# Patient Record
Sex: FEMALE | Race: WHITE | Employment: OTHER | URBAN - METROPOLITAN AREA
[De-identification: names, ages, dates, MRNs, and addresses within clinical notes are randomized per-mention and may not be internally consistent; named-entity substitution may affect disease eponyms.]

---

## 2021-09-01 ENCOUNTER — HOSPITAL ENCOUNTER (OUTPATIENT)
Dept: PHYSICAL THERAPY | Age: 71
Setting detail: THERAPIES SERIES
Discharge: HOME | End: 2021-09-01
Payer: MEDICARE

## 2021-09-01 PROCEDURE — 97161 PT EVAL LOW COMPLEX 20 MIN: CPT

## 2021-09-01 NOTE — PLAN OF CARE
has worn orthotics for years and has issues with shoe wear. Changed shoes in 2018 and started having increased R foot pain that has gradually be increasing. 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 Has been noticing that she has callus on inside of small toe, more of a bunion on her R great toe and decreasing arch. Foot has been swelling some along arch as well. Has been feeling more limited with walking, shagging balls for volleyball. Use to shag volleyballs for 2-3 hours, but can hardly manage 1 hour. At practice every day. Has been using lace up ankle braces for the past 2 years. 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    Living Status/Prior Level of Function: Independent with ADLs and IADLs    OBJECTIVE:     ROM LEFT RIGHT   HIP Flex     HIP Abd     HIP Ext     HIP IR     HIP ER     Knee ext     Knee Flex     Ankle PF 50 70   Ankle DF 2 5   Ankle In 15 30   Ankle Ev 15 20   Strength  LEFT RIGHT   HIP Flexors     HIP Abductors     HIP Ext     Hip ER     Knee EXT (quad)     Knee Flex (HS)     Ankle DF  4   Ankle PF  4   Ankle Inv  3+   Ankle EV  3     Reflexes/Sensation:    [x]Dermatomes/Myotomes intact    [x]Reflexes equal and normal bilaterally   []Other:    Joint mobility:    []Normal    [x]Hypo   []Hyper    Palpation: tender to palpate along R navicular bone    Functional Mobility/Transfers: limited in shagging volleyballs and walking/standing for long periods    Posture: patient with 2 different size feet and different leg length. Has custom shoes build for height difference. Use of orthotics in shoes. Foot posture- patient with pes cavus presentation and increased plantar flexion of ray 1-4    Bandages/Dressings/Incisions: n/a    Gait: (include devices/WB status) mild lateral leaning    Orthopedic Special Tests:                        [x] Patient history, allergies, meds reviewed. Medical chart reviewed. See intake form. Review Of Systems (ROS):  [x]Performed Review of systems (Integumentary, CardioPulmonary, Neurological) by intake and observation. Intake form has been scanned into medical record. Patient has been instructed to contact their primary care physician regarding ROS issues if not already being addressed at this time.       Co-morbidities/Complexities (which will affect course of rehabilitation):   []None           Arthritic conditions   []Rheumatoid arthritis (M05.9)  []Osteoarthritis (M19.91)   Cardiovascular conditions   []Hypertension (I10)  []Hyperlipidemia (E78.5)  []Angina pectoris (I20)  []Atherosclerosis (I70)  []CVA Musculoskeletal conditions   []Disc pathology   []Congenital spine pathologies   []Prior surgical intervention  []Osteoporosis (M81.8)  []Osteopenia (M85.8)   Endocrine conditions   []Hypothyroid (E03.9)  []Hyperthyroid Gastrointestinal conditions   []Constipation (Q62.30)   Metabolic conditions   []Morbid obesity (E66.01)  []Diabetes type 1(E10.65) or 2 (E11.65)   []Neuropathy (G60.9)     Pulmonary conditions   []Asthma (J45)  []Coughing   []COPD (J44.9)   Psychological Disorders  [x]Anxiety (F41.9)  []Depression (F32.9)   []Other:   []Other:          Barriers to/and or personal factors that will affect rehab potential:              []Age  []Sex    []Smoker              []Motivation/Lack of Motivation                        [x]Co-Morbidities              []Cognitive Function, education/learning barriers              []Environmental, home barriers              []profession/work barriers  []past PT/medical experience  []other:  Justification: Patient is highly anxious individual     Falls Risk Assessment (30 days):   [x] Falls Risk assessed and no intervention required. [] Falls Risk assessed and Patient requires intervention due to being higher risk   TUG score (>12s at risk):     [] Falls education provided, including         ASSESSMENT: Patient is a 81 yo female who presents to therapy with R foot pain which is limiting her ability to stand and walk for greater than 1 hour and participate in helping out [employer] volleyball team. Upon assessment, patient with decreased joint mobility of R ankle, increased soft tissue restriction in R gastroc, as well as increased navicular drop and pf of rays 1-4. Patient will benefit from PT services, as well as new custom orthotic devices. Patient limited in financial situation, will perform assessment for orthotics - however may not plan to get devices at this time.      Functional Impairments:     []Noted lumbar/proximal hip/LE hypomobility   [x]Decreased LE functional ROM   [x]Decreased core/proximal hip strength and neuromuscular control   [x]Decreased LE functional strength   [x]Reduced balance/proprioceptive control   []other:      Functional Activity Limitations (from functional questionnaire and intake)   [x]Reduced ability to tolerate prolonged functional positions   []Reduced ability or difficulty with changes of positions or transfers between positions   []Reduced ability to maintain good posture and demonstrate good body mechanics with sitting, bending, and lifting   [x]Reduced ability to sleep   [] Reduced ability or tolerance with driving and/or computer work   []Reduced ability to perform lifting, carrying tasks   []Reduced ability to squat   []Reduced ability to forward bend   [x]Reduced ability to ambulate prolonged functional periods/distances/surfaces   [x]Reduced ability to ascend/descend stairs   []Reduced ability to run, hop or jump   [x]other:shag volleyballs     Participation Restrictions   []Reduced participation in self care activities   []Reduced participation in home management activities   [x]Reduced participation in work activities   []Reduced participation in social activities. []Reduced participation in sport activities. Classification :    []Signs/symptoms consistent with post-surgical status including decreased ROM, strength and function.    []Signs/symptoms consistent with joint sprain/strain   []Signs/symptoms consistent with patella-femoral syndrome   []Signs/symptoms consistent with knee OA/hip OA   []Signs/symptoms consistent with internal derangement of knee/Hip   []Signs/symptoms consistent with functional hip weakness/NMR control      [x]Signs/symptoms consistent with tendinitis/tendinosis    []signs/symptoms consistent with pathology which may benefit from Dry needling      []other:      Prognosis/Rehab Potential:      []Excellent   [x]Good    []Fair   []Poor    Tolerance of evaluation/treatment:    []Excellent   [x]Good    []Fair   []Poor    Physical Therapy Evaluation Complexity Justification  [x] A history of present problem with:  [] no personal factors and/or comorbidities that impact the plan of care;  [x]1-2 personal factors and/or comorbidities that impact the plan of care  []3 personal factors and/or comorbidities that impact the plan of care  [x] An examination of body systems using standardized tests and measures addressing any of the following: body structures and functions (impairments), activity limitations, and/or participation restrictions;:  [x] a total of 1-2 or more elements   [] a total of 3 or more elements   [] a total of 4 or more elements   [x] A clinical presentation with:  [x] stable and/or uncomplicated characteristics   [] evolving clinical presentation with changing characteristics  [] unstable and unpredictable characteristics;   [x] Clinical decision making of [] low, [] moderate, [] high complexity using standardized patient assessment instrument and/or measurable assessment of functional outcome. [x] EVAL (LOW) 73260 (typically 20 minutes face-to-face)  [] EVAL (MOD) 62406 (typically 30 minutes face-to-face)  [] EVAL (HIGH) 28917 (typically 45 minutes face-to-face)  [] RE-EVAL     PLAN:  Frequency/Duration:  1-2 days per week for 4 Weeks:  Interventions:  [x]  Therapeutic exercise including: strength training, ROM, for Lower extremity and core   [x]  NMR activation and proprioception for LE, Glutes and Core   [x]  Manual therapy as indicated for LE, Hip and spine to include: Dry Needling/IASTM, STM, PROM, Gr I-IV mobilizations, manipulation. [x] Modalities as needed that may include: thermal agents, E-stim, Biofeedback, US, iontophoresis as indicated  [x] Patient education on joint protection, postural re-education, activity modification, progression of HEP. HEP instruction: (see scanned forms)    GOALS:  Patient stated goal: walk without pain  [] Progressing: [] Met: [] Not Met: [] Adjusted    Therapist goals for Patient:   Short Term Goals: To be achieved in: 2 weeks  1. Independent in HEP and progression per patient tolerance, in order to prevent re-injury. [] Progressing: [] Met: [] Not Met: [] Adjusted  2. Patient will have a decrease in pain to facilitate improvement in movement, function, and ADLs as indicated by Functional Deficits. [] Progressing: [] Met: [] Not Met: [] Adjusted    Long Term Goals: To be achieved in: 4 weeks  1. Disability index score of 40% or less for the LEFS to assist with reaching prior level of function. [] Progressing: [] Met: [] Not Met: [] Adjusted  2. Patient will demonstrate increased AROM to 0-10 ankle DF to allow for proper joint functioning as indicated by patients Functional Deficits. [] Progressing: [] Met: [] Not Met: [] Adjusted  3.  Patient will demonstrate an increase in Strength to good proximal hip strength and control, within 5lb HHD in LE to allow for proper functional mobility as indicated by patients Functional Deficits. [] Progressing: [] Met: [] Not Met: [] Adjusted  4. Patient will return to standing and walking functional activities without increased symptoms or restriction. [] Progressing: [] Met: [] Not Met: [] Adjusted  5.  Patient will report  that she is able to shag volleyballs for > 1 hr without increased symptoms or restriction. (patient specific functional goal)    [] Progressing: [] Met: [] Not Met: [] Adjusted     Electronically signed by:  Corinna Finnegan PT

## 2021-09-01 NOTE — FLOWSHEET NOTE
[facility]  [address] [address] [place] [address] [place]  Phone: [phone] Fax: [phone]    Physical Therapy Treatment Note/ Progress Report:     Date:  2021    Patient Name:  Maykel Singleton    :  1950  MRN: [id number]  Restrictions/Precautions:    Medical/Treatment Diagnosis Information:  Diagnosis: R foot pain, bilateral plantar fascitis  Treatment Diagnosis: bilateral plantar fascitis M72.2, R foot pain E69.689  Insurance/Certification information:  PT Insurance Information: Medicare//Aetna  Physician Information:  Referring Practitioner: Dr Ankita Hernandez of care signed (Y/N):     Date of Patient follow up with Physician:      Progress Report: [x]  Yes  []  No     Date Range for reporting period:  Beginnin2021  Ending:  -    Progress report due (10 Rx/or 30 days whichever is less): 28/3/1768     Recertification due (POC duration/ or 90 days whichever is less): 10/1/2021     Visit # Insurance Allowable Auth Needed   1 Medicare/Aetna []Yes   [x]No     Latex Allergy:  [x]NO      []YES  Preferred Language for Healthcare:   [x]English       []other:  Functional Scale: LEFS 55% disability Date assessed:2021    Pain level:  5-6/10     SUBJECTIVE:  See eval    OBJECTIVE: See eval   Observation:    Test measurements:      RESTRICTIONS/PRECAUTIONS: R THR     Exercises/Interventions:     Therapeutic Ex (77067)   Min: Sets/sec Reps Notes/CUES   Retro Stepper/BIKE      Alter G      BFR      Sportcord March      3 way SLR      SAQ      Clam ABD      Hip Ext Savita Pancoast      BOSU fwd/side lunge      BOSU squat      Leg Press Iso/Con/Ecc 0-      Cybex HS curl      TKE      Glute side walks      RDL      Slide Lunge      Slide HS eccentrics      Step ups/ecc step down      Swissball wall rolls- in SLS- hip drive      Quad hip ext/wall-ball rolls                  Manual Intervention (50062)  Min:      Knee mobs/PROM      Tib/Fem Mobs      Patella Adjusted  2. Patient will demonstrate increased AROM to 0-10 ankle DF to allow for proper joint functioning as indicated by patients Functional Deficits. [] Progressing: [] Met: [] Not Met: [] Adjusted  3. Patient will demonstrate an increase in Strength to good proximal hip strength and control, within 5lb HHD in LE to allow for proper functional mobility as indicated by patients Functional Deficits. [] Progressing: [] Met: [] Not Met: [] Adjusted  4. Patient will return to standing and walking functional activities without increased symptoms or restriction. [] Progressing: [] Met: [] Not Met: [] Adjusted  5. Patient will report  that she is able to shag volleyballs for > 1 hr without increased symptoms or restriction. (patient specific functional goal)    [] Progressing: [] Met: [] Not Met: [] Adjusted     ASSESSMENT:  See eval    Return to Play: (if applicable)   []  Stage 1: Intro to Strength   []  Stage 2: Return to Run and Strength   []  Stage 3: Return to Jump and Strength   []  Stage 4: Dynamic Strength and Agility   []  Stage 5: Sport Specific Training     []  Ready to Return to Play, Meets All Above Stages   []  Not Ready for Return to Sports   Comments:            Treatment/Activity Tolerance:  [x] Patient tolerated treatment well [] Patient limited by fatique  [] Patient limited by pain  [] Patient limited by other medical complications  [] Other:     Overall Progression Towards Functional goals/ Treatment Progress Update:  [] Patient is progressing as expected towards functional goals listed. [] Progression is slowed due to complexities/Impairments listed. [] Progression has been slowed due to co-morbidities.   [x] Plan just implemented, too soon to assess goals progression <30days   [] Goals require adjustment due to lack of progress  [] Patient is not progressing as expected and requires additional follow up with physician  [] Other    Prognosis for POC: [x] Good [] Fair  [] Poor    Patient requires continued skilled intervention: [x] Yes  [] No        PLAN: See eval  [] Continue per plan of care [] Alter current plan (see comments)  [x] Plan of care initiated [] Hold pending MD visit [] Discharge    Electronically signed by: Codie Mason PT    Note: If patient does not return for scheduled/recommended follow up visits, this note will serve as a discharge from care along with the most recent update on progress.

## 2021-09-08 ENCOUNTER — HOSPITAL ENCOUNTER (OUTPATIENT)
Dept: PHYSICAL THERAPY | Age: 71
Setting detail: THERAPIES SERIES
Discharge: HOME | End: 2021-09-08
Payer: MEDICARE

## 2021-09-08 PROCEDURE — 97110 THERAPEUTIC EXERCISES: CPT

## 2021-09-08 PROCEDURE — 97140 MANUAL THERAPY 1/> REGIONS: CPT

## 2021-09-08 NOTE — FLOWSHEET NOTE
[facility] [address] [address] [place] [address]  Phone: [phone] Fax: [phone]    Physical Therapy Treatment Note/ Progress Report:     Date:  2021    Patient Name:  Leah Ashley    :  1950  MRN: [id number]  Restrictions/Precautions:    Medical/Treatment Diagnosis Information:  Diagnosis: R foot pain, bilateral plantar fascitis  Treatment Diagnosis: bilateral plantar fascitis M72.2, R foot pain B11.089  Insurance/Certification information:  PT Insurance Information: Medicare//Aetna  Physician Information:  Referring Practitioner: Dr Ulysses Old of care signed (Y/N):     Date of Patient follow up with Physician:      Progress Report: [x]  Yes  []  No     Date Range for reporting period:  Beginnin2021  Ending:  -    Progress report due (10 Rx/or 30 days whichever is less):      Recertification due (POC duration/ or 90 days whichever is less): 10/1/2021     Visit # Insurance Allowable Auth Needed   2 Medicare/Aetna []Yes   [x]No     Latex Allergy:  [x]NO      []YES  Preferred Language for Healthcare:   [x]English       []other:  Functional Scale: LEFS 55% disability Date assessed:2021    Pain level:  5-6/10     SUBJECTIVE:  Patient reports that she was able to shag balls for 1.5 hours, feet still hurt quite a bit when she was finished. R foot hurting along R arch.      OBJECTIVE: See eval   Observation:    Test measurements:      RESTRICTIONS/PRECAUTIONS: R THR     Exercises/Interventions:     Therapeutic Ex (88037)   Min: 25 Sets/sec Reps Notes/CUES   Retro Stepper/BIKE      Gastroc stretch 30 4    Standing posterior tib activation 10 10/eac bilat   Standing heel raise- rolled towel under met heads 1 10    Seated toe curls  1 5 bilat   SAQ      Clam ABD      Hip Ext Selena Radford      BOSU fwd/side lunge      BOSU squat      Leg Press Iso/Con/Ecc 0-      Cybex HS curl      TKE      Glute side walks      RDL Slide Lunge      Slide HS eccentrics      Step ups/ecc step down      Swissball wall rolls- in SLS- hip drive      Quad hip ext/wall-ball rolls                  Manual Intervention (81071)  Min: 20 min      Knee mobs/PROM      Tib/Fem Mobs      Patella Mobs      Ankle mobs                  NMR re-education (83739)  Min:   CUES NEEDED   Tuvaluan/Biofeedback 10/10      BFR      G. Med activation      Hip Ext full ROM/ G. Activation      Bosu Bal and Prop- G Med      Single leg stance/Balance/Prop      Bosu Retro G. Med act      Prone Hip froggers- sliders/elevated            Therapeutic Activity (41993)  Min:      Ladders      Plyos      Dynamic Balance                            Therapeutic Exercise and NMR EXR  [x] (71839) Provided verbal/tactile cueing for activities related to strengthening, flexibility, endurance, ROM for improvements in LE, proximal hip, and core control with self care, mobility, lifting, ambulation. [x] (84748) Provided verbal/tactile cueing for activities related to improving balance, coordination, kinesthetic sense, posture, motor skill, proprioception  to assist with LE, proximal hip, and core control in self care, mobility, lifting, ambulation and eccentric single leg control.      NMR and Therapeutic Activities:    [x] (32218 or 62326) Provided verbal/tactile cueing for activities related to improving balance, coordination, kinesthetic sense, posture, motor skill, proprioception and motor activation to allow for proper function of core, proximal hip and LE with self care and ADLs and functional mobility.   [] (14233) Gait Re-education- Provided training and instruction to the patient for proper LE, core and proximal hip recruitment and positioning and eccentric body weight control with ambulation re-education including up and down stairs     Home Exercise Program:    [x] (26787) Reviewed/Progressed HEP activities related to strengthening, flexibility, endurance, ROM of core, proximal hip and LE for functional self-care, mobility, lifting and ambulation/stair navigation   [] (34371)Reviewed/Progressed HEP activities related to improving balance, coordination, kinesthetic sense, posture, motor skill, proprioception of core, proximal hip and LE for self care, mobility, lifting, and ambulation/stair navigation      Manual Treatments:  PROM / STM / Oscillations-Mobs:  G-I, II, III, IV (PA's, Inf., Post.)  [x] (95932) Provided manual therapy to mobilize LE, proximal hip and/or LS spine soft tissue/joints for the purpose of modulating pain, promoting relaxation,  increasing ROM, reducing/eliminating soft tissue swelling/inflammation/restriction, improving soft tissue extensibility and allowing for proper ROM for normal function with self care, mobility, lifting and ambulation. Modalities:     [] GAME READY (VASO)- for significant edema, swelling, pain control. Charges:  Timed Code Treatment Minutes: 45   Total Treatment Minutes: 45      [] EVAL (LOW) 07003 (typically 20 minutes face-to-face)  [] EVAL (MOD) 23353 (typically 30 minutes face-to-face)  [] EVAL (HIGH) 77867 (typically 45 minutes face-to-face)  [] RE-EVAL     [x] UV(91151) x   2  [] DRY NEEDLE 1 OR 2 MUSCLES  [] NMR (51059) x     [] DRY NEEDLE 3+ MUSCLES  [x] Manual (09722) x 1      [] TA (51025) x     [] Mech Traction (12227)  [] ES(attended) (90411)     [] ES (un) (49959):   [] VASO (48743)  [] Other:    If [facility] Please Indicate Time In/Out  CPT Code Time in Time out                                   OALS:  Patient stated goal: walk without pain  [] Progressing: [] Met: [] Not Met: [] Adjusted    Therapist goals for Patient:   Short Term Goals: To be achieved in: 2 weeks  1. Independent in HEP and progression per patient tolerance, in order to prevent re-injury. [] Progressing: [] Met: [] Not Met: [] Adjusted  2.  Patient will have a decrease in pain to facilitate improvement in movement, function, and ADLs as indicated by Functional Deficits. [] Progressing: [] Met: [] Not Met: [] Adjusted    Long Term Goals: To be achieved in: 4 weeks  1. Disability index score of 40% or less for the LEFS to assist with reaching prior level of function. [] Progressing: [] Met: [] Not Met: [] Adjusted  2. Patient will demonstrate increased AROM to 0-10 ankle DF to allow for proper joint functioning as indicated by patients Functional Deficits. [] Progressing: [] Met: [] Not Met: [] Adjusted  3. Patient will demonstrate an increase in Strength to good proximal hip strength and control, within 5lb HHD in LE to allow for proper functional mobility as indicated by patients Functional Deficits. [] Progressing: [] Met: [] Not Met: [] Adjusted  4. Patient will return to standing and walking functional activities without increased symptoms or restriction. [] Progressing: [] Met: [] Not Met: [] Adjusted  5. Patient will report  that she is able to shag volleyballs for > 1 hr without increased symptoms or restriction. (patient specific functional goal)    [] Progressing: [] Met: [] Not Met: [] Adjusted     ASSESSMENT:  Patient very tight along R posterior tib. Tolerated all manual therapy to reduce soft tissue restriction. Addressed activation of posterior tib, gastroc activation with improved push through all met heads, as well as improving flexibility and intrinsic activation.      Return to Play: (if applicable)   []  Stage 1: Intro to Strength   []  Stage 2: Return to Run and Strength   []  Stage 3: Return to Jump and Strength   []  Stage 4: Dynamic Strength and Agility   []  Stage 5: Sport Specific Training     []  Ready to Return to Play, Meets All Above Stages   []  Not Ready for Return to Sports   Comments:            Treatment/Activity Tolerance:  [x] Patient tolerated treatment well [] Patient limited by fatique  [] Patient limited by pain  [] Patient limited by other medical complications  [] Other:     Overall Progression Towards Functional goals/ Treatment Progress Update:  [] Patient is progressing as expected towards functional goals listed. [] Progression is slowed due to complexities/Impairments listed. [] Progression has been slowed due to co-morbidities. [x] Plan just implemented, too soon to assess goals progression <30days   [] Goals require adjustment due to lack of progress  [] Patient is not progressing as expected and requires additional follow up with physician  [] Other    Prognosis for POC: [x] Good [] Fair  [] Poor    Patient requires continued skilled intervention: [x] Yes  [] No        PLAN: See eval  [] Continue per plan of care [] Alter current plan (see comments)  [x] Plan of care initiated [] Hold pending MD visit [] Discharge    Electronically signed by: Raudel Haque PT    Note: If patient does not return for scheduled/recommended follow up visits, this note will serve as a discharge from care along with the most recent update on progress.

## 2021-09-10 ENCOUNTER — HOSPITAL ENCOUNTER (OUTPATIENT)
Dept: PHYSICAL THERAPY | Age: 71
Setting detail: THERAPIES SERIES
Discharge: HOME | End: 2021-09-10
Payer: MEDICARE

## 2021-09-10 PROCEDURE — 97140 MANUAL THERAPY 1/> REGIONS: CPT

## 2021-09-10 NOTE — FLOWSHEET NOTE
[place] [address] [address]  Phone: [phone] Fax: [phone]    Physical Therapy Treatment Note/ Progress Report:     Date:  2021    Patient Name:  Martín Castrejon    :  1950  MRN: [id number]  Restrictions/Precautions:    Medical/Treatment Diagnosis Information:  Diagnosis: R foot pain, bilateral plantar fascitis  Treatment Diagnosis: bilateral plantar fascitis M72.2, R foot pain U70.498  Insurance/Certification information:  PT Insurance Information: Medicare//Aetna  Physician Information:  Referring Practitioner: Dr Jorge Benedict of care signed (Y/N):     Date of Patient follow up with Physician:      Progress Report: [x]  Yes  []  No     Date Range for reporting period:  Beginnin2021  Ending:  -    Progress report due (10 Rx/or 30 days whichever is less): 86/3/4198     Recertification due (POC duration/ or 90 days whichever is less): 10/1/2021     Visit # Insurance Allowable Auth Needed   2 Medicare/Aetna []Yes   [x]No     Latex Allergy:  [x]NO      []YES  Preferred Language for Healthcare:   [x]English       []other:  Functional Scale: LEFS 55% disability Date assessed:2021    Pain level:  5-6/10     SUBJECTIVE:  Patient presents for possible look at orthotic, as she has worn through old device. She has a long history of shoe modifications, orthotic modifications and pain in both ankles. She has a leg length discrepancy and foot length discrepancy which will need to be addressed in a device. 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      OBJECTIVE:     Current footwear: Ramone     LEFT RIGHT   Rearfoot position neutral neutral   Forefoot position neutral varus   1st Ray position neutral neutral   1st Ray mobility flexible flexible   Calcaneal position standing     Calcaneal position squatting     Total pronation       ROM LEFT RIGHT   HIP Flex     HIP Abd     HIP Ext     HIP IR     HIP ER     Knee ext     Knee Flex     DF knee bent [facility]   DF knee straight [facility] WFL             Strength  LEFT RIGHT   HIP Flexors     HIP Abductors     HIP Ext     Hip ER     Knee EXT (quad)     Knee Flex (HS)     Ankle DF     Ankle PF     Ankle Inv     Ankle EV       Deep Squat: dysfunctional, painful  SLS- eyes open: not tested  SLS- eyes closed: not tested  Foot strike: flat foot position  Pronation: accelerated         RESTRICTIONS/PRECAUTIONS: R THR     Exercises/Interventions:     Therapeutic Ex (25813)   Min:  Sets/sec Reps Notes/CUES   Retro Stepper/BIKE      Gastroc stretch 30 4    Standing posterior tib activation 10 10/eac bilat   Standing heel raise- rolled towel under met heads 1 10    Seated toe curls  1 5 bilat   SAQ      Clam ABD      Hip Ext [place] G      BOSU fwd/side lunge      BOSU squat      Leg Press Iso/Con/Ecc 0-      Cybex HS curl      TKE      Glute side walks      RDL      Slide Lunge      Slide HS eccentrics      Step ups/ecc step down      Swissball wall rolls- in SLS- hip drive      Quad hip ext/wall-ball rolls                  Manual Intervention (92778)  Min: 20 min      Knee mobs/PROM      Tib/Fem Mobs      Patella Mobs      Ankle mobs/STM 20'                 NMR re-education (36486)  Min:   CUES NEEDED   Qatari/Biofeedback 10/10      BFR      G. Med activation      Hip Ext full ROM/ G. Activation      Bosu Bal and Prop- G Med      Single leg stance/Balance/Prop      Bosu Retro G. Med act      Prone Hip froggers- sliders/elevated            Therapeutic Activity (08747)  Min:      Ladders      Plyos      Dynamic Balance                            Therapeutic Exercise and NMR EXR  [x] (52138) Provided verbal/tactile cueing for activities related to strengthening, flexibility, endurance, ROM for improvements in LE, proximal hip, and core control with self care, mobility, lifting, ambulation.   [x] (12551) Provided verbal/tactile cueing for activities related to improving balance, coordination, kinesthetic sense, posture, motor skill, proprioception  to assist with LE, proximal hip, and core control in self care, mobility, lifting, ambulation and eccentric single leg control. NMR and Therapeutic Activities:    [x] (56052 or 26450) Provided verbal/tactile cueing for activities related to improving balance, coordination, kinesthetic sense, posture, motor skill, proprioception and motor activation to allow for proper function of core, proximal hip and LE with self care and ADLs and functional mobility.   [] (54628) Gait Re-education- Provided training and instruction to the patient for proper LE, core and proximal hip recruitment and positioning and eccentric body weight control with ambulation re-education including up and down stairs     Home Exercise Program:    [x] (43980) Reviewed/Progressed HEP activities related to strengthening, flexibility, endurance, ROM of core, proximal hip and LE for functional self-care, mobility, lifting and ambulation/stair navigation   [] (72776)Reviewed/Progressed HEP activities related to improving balance, coordination, kinesthetic sense, posture, motor skill, proprioception of core, proximal hip and LE for self care, mobility, lifting, and ambulation/stair navigation      Manual Treatments:  PROM / STM / Oscillations-Mobs:  G-I, II, III, IV (PA's, Inf., Post.)  [x] (54947) Provided manual therapy to mobilize LE, proximal hip and/or LS spine soft tissue/joints for the purpose of modulating pain, promoting relaxation,  increasing ROM, reducing/eliminating soft tissue swelling/inflammation/restriction, improving soft tissue extensibility and allowing for proper ROM for normal function with self care, mobility, lifting and ambulation. Modalities:     [] GAME READY (VASO)- for significant edema, swelling, pain control.      Charges:  Timed Code Treatment Minutes: 35   Total Treatment Minutes: 35      [] EVAL (LOW) 24669 (typically 20 minutes face-to-face)  [] EVAL (MOD) 51245 (typically 30 minutes face-to-face)  [] EVAL (HIGH) 24234 (typically 45 minutes face-to-face)  [] RE-EVAL     [] KF(72382) x     [] DRY NEEDLE 1 OR 2 MUSCLES  [] NMR (36055) x     [] DRY NEEDLE 3+ MUSCLES  [x] Manual (34225) x 1      [] TA (18497) x     [] Mech Traction (33311)  [] ES(attended) (41135)     [] ES (un) (74723):   [] VASO (15568)  [] Other:    If BWC Please Indicate Time In/Out  CPT Code Time in Time out                                   OALS:  Patient stated goal: walk without pain  [] Progressing: [] Met: [] Not Met: [] Adjusted    Therapist goals for Patient:   Short Term Goals: To be achieved in: 2 weeks  1. Independent in HEP and progression per patient tolerance, in order to prevent re-injury. [] Progressing: [] Met: [] Not Met: [] Adjusted  2. Patient will have a decrease in pain to facilitate improvement in movement, function, and ADLs as indicated by Functional Deficits. [] Progressing: [] Met: [] Not Met: [] Adjusted    Long Term Goals: To be achieved in: 4 weeks  1. Disability index score of 40% or less for the LEFS to assist with reaching prior level of function. [] Progressing: [] Met: [] Not Met: [] Adjusted  2. Patient will demonstrate increased AROM to 0-10 ankle DF to allow for proper joint functioning as indicated by patients Functional Deficits. [] Progressing: [] Met: [] Not Met: [] Adjusted  3. Patient will demonstrate an increase in Strength to good proximal hip strength and control, within 5lb HHD in LE to allow for proper functional mobility as indicated by patients Functional Deficits. [] Progressing: [] Met: [] Not Met: [] Adjusted  4. Patient will return to standing and walking functional activities without increased symptoms or restriction. [] Progressing: [] Met: [] Not Met: [] Adjusted  5.  Patient will report  that she is able to shag volleyballs for > 1 hr without increased symptoms or restriction. (patient specific functional goal)    [] Progressing: [] Met: [] Not Met: [] Adjusted     ASSESSMENT:  Patient presents with several foot mechanics issues which would benefit from fabrication of custom orthotics device to help with posterior tib insufficiency, hypermobility throughout forefoot and with hammering of right side toes. She has several devices made previously which are breaking down. She does not have current insurance coverage for devices and is on a fixed income. We will explore OTC options and follow up with device as appropriate. Return to Play: (if applicable)   []  Stage 1: Intro to Strength   []  Stage 2: Return to Run and Strength   []  Stage 3: Return to Jump and Strength   []  Stage 4: Dynamic Strength and Agility   []  Stage 5: Sport Specific Training     []  Ready to Return to Play, Meets All Above Stages   []  Not Ready for Return to Sports   Comments:            Treatment/Activity Tolerance:  [x] Patient tolerated treatment well [] Patient limited by fatique  [] Patient limited by pain  [] Patient limited by other medical complications  [] Other:     Overall Progression Towards Functional goals/ Treatment Progress Update:  [] Patient is progressing as expected towards functional goals listed. [] Progression is slowed due to complexities/Impairments listed. [] Progression has been slowed due to co-morbidities.   [x] Plan just implemented, too soon to assess goals progression <30days   [] Goals require adjustment due to lack of progress  [] Patient is not progressing as expected and requires additional follow up with physician  [] Other    Prognosis for POC: [x] Good [] Fair  [] Poor    Patient requires continued skilled intervention: [x] Yes  [] No        PLAN:   [x] Continue per plan of care [] Alter current plan (see comments)  [] Plan of care initiated [] Hold pending MD visit [] Discharge    Electronically signed by: Arsalan Warren PT    Note: If patient does not return for scheduled/recommended follow up visits, this note will serve as a discharge from care along with the most recent update on progress.

## 2021-09-13 ENCOUNTER — HOSPITAL ENCOUNTER (OUTPATIENT)
Dept: PHYSICAL THERAPY | Age: 71
Setting detail: THERAPIES SERIES
Discharge: HOME | End: 2021-09-13
Payer: MEDICARE

## 2021-09-13 PROCEDURE — 97110 THERAPEUTIC EXERCISES: CPT

## 2021-09-13 PROCEDURE — 97140 MANUAL THERAPY 1/> REGIONS: CPT

## 2021-09-13 NOTE — FLOWSHEET NOTE
[place] [address] [place] [address]  Phone: [phone] Fax: [phone]    Physical Therapy Treatment Note/ Progress Report:     Date:  2021    Patient Name:  Mounika Hammond    :  1950  MRN: [id number]  Restrictions/Precautions:    Medical/Treatment Diagnosis Information:  Diagnosis: R foot pain, bilateral plantar fascitis  Treatment Diagnosis: bilateral plantar fascitis M72.2, R foot pain P27.959  Insurance/Certification information:  PT Insurance Information: Medicare//Aetna  Physician Information:  Referring Practitioner: Dr Bowen Manning of care signed (Y/N):     Date of Patient follow up with Physician:      Progress Report: [x]  Yes  []  No     Date Range for reporting period:  Beginnin2021  Ending:  -    Progress report due (10 Rx/or 30 days whichever is less):      Recertification due (POC duration/ or 90 days whichever is less): 10/1/2021     Visit # Insurance Allowable Auth Needed   4 Medicare/Aetna []Yes   [x]No     Latex Allergy:  [x]NO      []YES  Preferred Language for Healthcare:   [x]English       []other:  Functional Scale: LEFS 55% disability Date assessed:2021    Pain level:  5-6/10     SUBJECTIVE:  Patient reports that she is feeling some discomfort still in R foot. Notes that she felt ok after last session, but then went to volleyball and had increased soreness in foot. Has had some sharp pain in foot while walking around house without any shoes. Also feeling a little bit in her heel. Brought in additional shoes that she has to look at modifications.       OBJECTIVE:     Current footwear: Ramone     LEFT RIGHT   Rearfoot position neutral neutral   Forefoot position neutral varus   1st Ray position neutral neutral   1st Ray mobility flexible flexible   Calcaneal position standing     Calcaneal position squatting     Total pronation       ROM LEFT RIGHT   HIP Flex     HIP Abd     HIP Ext HIP IR     HIP ER     Knee ext     Knee Flex     [facility]   DF knee straight [facility] WFL             Strength  LEFT RIGHT   HIP Flexors     HIP Abductors     HIP Ext     Hip ER     Knee EXT (quad)     Knee Flex (HS)     Ankle DF     Ankle PF     Ankle Inv     Ankle EV       Deep Squat: dysfunctional, painful  SLS- eyes open: not tested  SLS- eyes closed: not tested  Foot strike: flat foot position  Pronation: accelerated         RESTRICTIONS/PRECAUTIONS: R THR     Exercises/Interventions:     Therapeutic Ex (02448)   Min: 25 Sets/sec Reps Notes/CUES   Retro Stepper/BIKE      Gastroc stretch 30 4    Standing posterior tib activation 10 10/eac bilat   Standing heel raise- rolled towel under met heads 1 10    Seated toe curls  1 5 bilat   SAQ      Clam ABD      Hip Ext [place] Jabs      BOSU fwd/side lunge      BOSU squat      Leg Press Iso/Con/Ecc 0-      Cybex HS curl      TKE      Glute side walks      RDL      Slide Lunge      Slide HS eccentrics      Step ups/ecc step down      Swissball wall rolls- in SLS- hip drive      Quad hip ext/wall-ball rolls                  Manual Intervention (49472)  Min: 15 min      Knee mobs/PROM      Tib/Fem Mobs      Patella Mobs      Ankle mobs/STM 15                 NMR re-education (54001)  Min:   CUES NEEDED   Tanzanian/Biofeedback 10/10      BFR      G. Med activation      Hip Ext full ROM/ G. Activation      Bosu Bal and Prop- G Med      Single leg stance/Balance/Prop      Bosu Retro G. Med act      Prone Hip froggers- sliders/elevated            Therapeutic Activity (24487)  Min:      Ladders      Plyos      Dynamic Balance                            Therapeutic Exercise and NMR EXR  [x] (88054) Provided verbal/tactile cueing for activities related to strengthening, flexibility, endurance, ROM for improvements in LE, proximal hip, and core control with self care, mobility, lifting, ambulation.   [x] (73375) Provided verbal/tactile cueing for activities related to improving balance, coordination, kinesthetic sense, posture, motor skill, proprioception  to assist with LE, proximal hip, and core control in self care, mobility, lifting, ambulation and eccentric single leg control. NMR and Therapeutic Activities:    [x] (87785 or 75605) Provided verbal/tactile cueing for activities related to improving balance, coordination, kinesthetic sense, posture, motor skill, proprioception and motor activation to allow for proper function of core, proximal hip and LE with self care and ADLs and functional mobility.   [] (77448) Gait Re-education- Provided training and instruction to the patient for proper LE, core and proximal hip recruitment and positioning and eccentric body weight control with ambulation re-education including up and down stairs     Home Exercise Program:    [x] (74465) Reviewed/Progressed HEP activities related to strengthening, flexibility, endurance, ROM of core, proximal hip and LE for functional self-care, mobility, lifting and ambulation/stair navigation   [] (09697)Reviewed/Progressed HEP activities related to improving balance, coordination, kinesthetic sense, posture, motor skill, proprioception of core, proximal hip and LE for self care, mobility, lifting, and ambulation/stair navigation      Manual Treatments:  PROM / STM / Oscillations-Mobs:  G-I, II, III, IV (PA's, Inf., Post.)  [x] (48226) Provided manual therapy to mobilize LE, proximal hip and/or LS spine soft tissue/joints for the purpose of modulating pain, promoting relaxation,  increasing ROM, reducing/eliminating soft tissue swelling/inflammation/restriction, improving soft tissue extensibility and allowing for proper ROM for normal function with self care, mobility, lifting and ambulation. Modalities:     [] GAME READY (VASO)- for significant edema, swelling, pain control.      Charges:  Timed Code Treatment Minutes: 40   Total Treatment Minutes: 40      [] EVAL (LOW) 98593 (typically 20 minutes face-to-face)  [] EVAL (MOD) 65217 (typically 30 minutes face-to-face)  [] EVAL (HIGH) 08851 (typically 45 minutes face-to-face)  [] RE-EVAL     [x] TI(25760) x  2   [] DRY NEEDLE 1 OR 2 MUSCLES  [] NMR (85248) x     [] DRY NEEDLE 3+ MUSCLES  [x] Manual (75301) x 1      [] TA (09263) x     [] Mech Traction (57562)  [] ES(attended) (21311)     [] ES (un) (76906):   [] VASO (20882)  [] Other:    If BWC Please Indicate Time In/Out  CPT Code Time in Time out                                   OALS:  Patient stated goal: walk without pain  [] Progressing: [] Met: [] Not Met: [] Adjusted    Therapist goals for Patient:   Short Term Goals: To be achieved in: 2 weeks  1. Independent in HEP and progression per patient tolerance, in order to prevent re-injury. [] Progressing: [] Met: [] Not Met: [] Adjusted  2. Patient will have a decrease in pain to facilitate improvement in movement, function, and ADLs as indicated by Functional Deficits. [] Progressing: [] Met: [] Not Met: [] Adjusted    Long Term Goals: To be achieved in: 4 weeks  1. Disability index score of 40% or less for the LEFS to assist with reaching prior level of function. [] Progressing: [] Met: [] Not Met: [] Adjusted  2. Patient will demonstrate increased AROM to 0-10 ankle DF to allow for proper joint functioning as indicated by patients Functional Deficits. [] Progressing: [] Met: [] Not Met: [] Adjusted  3. Patient will demonstrate an increase in Strength to good proximal hip strength and control, within 5lb HHD in LE to allow for proper functional mobility as indicated by patients Functional Deficits. [] Progressing: [] Met: [] Not Met: [] Adjusted  4. Patient will return to standing and walking functional activities without increased symptoms or restriction. [] Progressing: [] Met: [] Not Met: [] Adjusted  5.  Patient will report  that she is able to shag volleyballs for > 1 hr without increased symptoms or restriction. (patient specific functional goal)    [] Progressing: [] Met: [] Not Met: [] Adjusted     ASSESSMENT:  Patient tolerated all manual therapy, stretching and strengthening for R foot. Provided patient with written HEP to continue with these at home. Will progress further strengthening next visit. Return to Play: (if applicable)   []  Stage 1: Intro to Strength   []  Stage 2: Return to Run and Strength   []  Stage 3: Return to Jump and Strength   []  Stage 4: Dynamic Strength and Agility   []  Stage 5: Sport Specific Training     []  Ready to Return to Play, Meets All Above Stages   []  Not Ready for Return to Sports   Comments:            Treatment/Activity Tolerance:  [x] Patient tolerated treatment well [] Patient limited by fatique  [] Patient limited by pain  [] Patient limited by other medical complications  [] Other:     Overall Progression Towards Functional goals/ Treatment Progress Update:  [] Patient is progressing as expected towards functional goals listed. [] Progression is slowed due to complexities/Impairments listed. [] Progression has been slowed due to co-morbidities. [x] Plan just implemented, too soon to assess goals progression <30days   [] Goals require adjustment due to lack of progress  [] Patient is not progressing as expected and requires additional follow up with physician  [] Other    Prognosis for POC: [x] Good [] Fair  [] Poor    Patient requires continued skilled intervention: [x] Yes  [] No        PLAN:   [x] Continue per plan of care [] Alter current plan (see comments)  [] Plan of care initiated [] Hold pending MD visit [] Discharge    Electronically signed by: Kelly Morales PT    Note: If patient does not return for scheduled/recommended follow up visits, this note will serve as a discharge from care along with the most recent update on progress.

## 2021-09-20 ENCOUNTER — HOSPITAL ENCOUNTER (OUTPATIENT)
Dept: PHYSICAL THERAPY | Age: 71
Setting detail: THERAPIES SERIES
Discharge: HOME | End: 2021-09-20
Payer: MEDICARE

## 2021-09-20 PROCEDURE — 97110 THERAPEUTIC EXERCISES: CPT

## 2021-09-20 PROCEDURE — 97140 MANUAL THERAPY 1/> REGIONS: CPT

## 2021-09-20 NOTE — FLOWSHEET NOTE
control in self care, mobility, lifting, ambulation and eccentric single leg control. NMR and Therapeutic Activities:    [x] (38153 or 71412) Provided verbal/tactile cueing for activities related to improving balance, coordination, kinesthetic sense, posture, motor skill, proprioception and motor activation to allow for proper function of core, proximal hip and LE with self care and ADLs and functional mobility.   [] (00246) Gait Re-education- Provided training and instruction to the patient for proper LE, core and proximal hip recruitment and positioning and eccentric body weight control with ambulation re-education including up and down stairs     Home Exercise Program:    [x] (34063) Reviewed/Progressed HEP activities related to strengthening, flexibility, endurance, ROM of core, proximal hip and LE for functional self-care, mobility, lifting and ambulation/stair navigation   [] (07321)Reviewed/Progressed HEP activities related to improving balance, coordination, kinesthetic sense, posture, motor skill, proprioception of core, proximal hip and LE for self care, mobility, lifting, and ambulation/stair navigation      Manual Treatments:  PROM / STM / Oscillations-Mobs:  G-I, II, III, IV (PA's, Inf., Post.)  [x] (87818) Provided manual therapy to mobilize LE, proximal hip and/or LS spine soft tissue/joints for the purpose of modulating pain, promoting relaxation,  increasing ROM, reducing/eliminating soft tissue swelling/inflammation/restriction, improving soft tissue extensibility and allowing for proper ROM for normal function with self care, mobility, lifting and ambulation. Modalities:     [] GAME READY (VASO)- for significant edema, swelling, pain control.      Charges:  Timed Code Treatment Minutes: 35   Total Treatment Minutes: 36      [] EVAL (LOW) 11772 (typically 20 minutes face-to-face)  [] EVAL (MOD) 10953 (typically 30 minutes face-to-face)  [] EVAL (HIGH) 88238 (typically 45 minutes stretching and strengthening for R foot. Progressed strengthening for foot and provided patient with written HEP to continue with these at home. Will progress further strengthening next visit. Return to Play: (if applicable)   []  Stage 1: Intro to Strength   []  Stage 2: Return to Run and Strength   []  Stage 3: Return to Jump and Strength   []  Stage 4: Dynamic Strength and Agility   []  Stage 5: Sport Specific Training     []  Ready to Return to Play, Meets All Above Stages   []  Not Ready for Return to Sports   Comments:            Treatment/Activity Tolerance:  [x] Patient tolerated treatment well [] Patient limited by fatique  [] Patient limited by pain  [] Patient limited by other medical complications  [] Other:     Overall Progression Towards Functional goals/ Treatment Progress Update:  [] Patient is progressing as expected towards functional goals listed. [] Progression is slowed due to complexities/Impairments listed. [] Progression has been slowed due to co-morbidities. [x] Plan just implemented, too soon to assess goals progression <30days   [] Goals require adjustment due to lack of progress  [] Patient is not progressing as expected and requires additional follow up with physician  [] Other    Prognosis for POC: [x] Good [] Fair  [] Poor    Patient requires continued skilled intervention: [x] Yes  [] No        PLAN:   [x] Continue per plan of care [] Alter current plan (see comments)  [] Plan of care initiated [] Hold pending MD visit [] Discharge    Electronically signed by: Ivan Peralta PT    Note: If patient does not return for scheduled/recommended follow up visits, this note will serve as a discharge from care along with the most recent update on progress.

## 2021-09-27 ENCOUNTER — HOSPITAL ENCOUNTER (OUTPATIENT)
Dept: PHYSICAL THERAPY | Age: 71
Setting detail: THERAPIES SERIES
Discharge: HOME | End: 2021-09-27
Payer: MEDICARE

## 2021-09-27 PROCEDURE — 97110 THERAPEUTIC EXERCISES: CPT

## 2021-09-27 PROCEDURE — 97140 MANUAL THERAPY 1/> REGIONS: CPT

## 2021-09-27 NOTE — FLOWSHEET NOTE
WFL   DF knee straight [facility] WFL             Strength  LEFT RIGHT   HIP Flexors     HIP Abductors     HIP Ext     Hip ER     Knee EXT (quad)     Knee Flex (HS)     Ankle DF     Ankle PF     Ankle Inv     Ankle EV       Deep Squat: dysfunctional, painful  SLS- eyes open: not tested  SLS- eyes closed: not tested  Foot strike: flat foot position  Pronation: accelerated         RESTRICTIONS/PRECAUTIONS: R THR     Exercises/Interventions:     Therapeutic Ex (77470)   Min: 25 Sets/sec Reps Notes/CUES   Retro Stepper/BIKE      Gastroc stretch 0     Standing posterior tib activation 1 15 bilat   Standing heel raise- rolled towel under met heads 1 15 Single leg   Seated toe curls  1 15 bilat   Ankle 4 way with red TB (latex free) 2 10 red   Clam ABD      Hip Ext Nichole Kehr      BOSU fwd/side lunge      BOSU squat      Leg Press Iso/Con/Ecc 0-      Cybex HS curl      TKE      Glute side walks      RDL      Slide Lunge      Slide HS eccentrics      Step ups/ecc step down      Swissball wall rolls- in SLS- hip drive      Quad hip ext/wall-ball rolls                  Manual Intervention (89490)  Min: 20 min      Knee mobs/PROM      Tib/Fem Mobs      Patella Mobs      Ankle mobs/STM 20                 NMR re-education (94938)  Min:   CUES NEEDED   Namibian/Biofeedback 10/10      BFR      G. Med activation      Hip Ext full ROM/ G. Activation      Bosu Bal and Prop- G Med      Single leg stance/Balance/Prop      Bosu Retro G. Med act      Prone Hip froggers- sliders/elevated            Therapeutic Activity (99191)  Min:      Ladders      Plyos      Dynamic Balance                            Therapeutic Exercise and NMR EXR  [x] (55361) Provided verbal/tactile cueing for activities related to strengthening, flexibility, endurance, ROM for improvements in LE, proximal hip, and core control with self care, mobility, lifting, ambulation.   [x] (08602) Provided verbal/tactile cueing for activities related to improving balance, coordination, kinesthetic sense, posture, motor skill, proprioception  to assist with LE, proximal hip, and core control in self care, mobility, lifting, ambulation and eccentric single leg control. NMR and Therapeutic Activities:    [x] (85567 or 58335) Provided verbal/tactile cueing for activities related to improving balance, coordination, kinesthetic sense, posture, motor skill, proprioception and motor activation to allow for proper function of core, proximal hip and LE with self care and ADLs and functional mobility.   [] (99099) Gait Re-education- Provided training and instruction to the patient for proper LE, core and proximal hip recruitment and positioning and eccentric body weight control with ambulation re-education including up and down stairs     Home Exercise Program:    [x] (32165) Reviewed/Progressed HEP activities related to strengthening, flexibility, endurance, ROM of core, proximal hip and LE for functional self-care, mobility, lifting and ambulation/stair navigation   [] (23263)Reviewed/Progressed HEP activities related to improving balance, coordination, kinesthetic sense, posture, motor skill, proprioception of core, proximal hip and LE for self care, mobility, lifting, and ambulation/stair navigation      Manual Treatments:  PROM / STM / Oscillations-Mobs:  G-I, II, III, IV (PA's, Inf., Post.)  [x] (31050) Provided manual therapy to mobilize LE, proximal hip and/or LS spine soft tissue/joints for the purpose of modulating pain, promoting relaxation,  increasing ROM, reducing/eliminating soft tissue swelling/inflammation/restriction, improving soft tissue extensibility and allowing for proper ROM for normal function with self care, mobility, lifting and ambulation. Modalities:     [] GAME READY (VASO)- for significant edema, swelling, pain control.      Charges:  Timed Code Treatment Minutes: 45   Total Treatment Minutes: 45      [] EVAL (LOW) 25234 (typically 20 minutes face-to-face)  [] EVAL (MOD) 08400 (typically 30 minutes face-to-face)  [] EVAL (HIGH) 74769 (typically 45 minutes face-to-face)  [] RE-EVAL     [x] RR(19760) x  2 KX   [] DRY NEEDLE 1 OR 2 MUSCLES  [] NMR (43119) x     [] DRY NEEDLE 3+ MUSCLES  [x] Manual (08491) x 1  KX    [] TA (15371) x     [] Mech Traction (32216)  [] ES(attended) (70747)     [] ES (un) (58644):   [] VASO (23058)  [] Other:    If BWC Please Indicate Time In/Out  CPT Code Time in Time out                                   OALS:  Patient stated goal: walk without pain  [] Progressing: [] Met: [] Not Met: [] Adjusted    Therapist goals for Patient:   Short Term Goals: To be achieved in: 2 weeks  1. Independent in HEP and progression per patient tolerance, in order to prevent re-injury. [] Progressing: [] Met: [] Not Met: [] Adjusted  2. Patient will have a decrease in pain to facilitate improvement in movement, function, and ADLs as indicated by Functional Deficits. [] Progressing: [] Met: [] Not Met: [] Adjusted    Long Term Goals: To be achieved in: 4 weeks  1. Disability index score of 40% or less for the LEFS to assist with reaching prior level of function. [] Progressing: [] Met: [] Not Met: [] Adjusted  2. Patient will demonstrate increased AROM to 0-10 ankle DF to allow for proper joint functioning as indicated by patients Functional Deficits. [] Progressing: [] Met: [] Not Met: [] Adjusted  3. Patient will demonstrate an increase in Strength to good proximal hip strength and control, within 5lb HHD in LE to allow for proper functional mobility as indicated by patients Functional Deficits. [] Progressing: [] Met: [] Not Met: [] Adjusted  4. Patient will return to standing and walking functional activities without increased symptoms or restriction. [] Progressing: [] Met: [] Not Met: [] Adjusted  5.  Patient will report  that she is able to shag volleyballs for > 1 hr without increased symptoms or restriction. (patient specific functional goal)    [] Progressing: [] Met: [] Not Met: [] Adjusted     ASSESSMENT:  Patient tolerated all manual therapy, stretching and strengthening for R foot. Patient improving in strength and also needing cues for improved form for proper activation of ankle musculature. Will continue to progress strengthening further next visit. Return to Play: (if applicable)   []  Stage 1: Intro to Strength   []  Stage 2: Return to Run and Strength   []  Stage 3: Return to Jump and Strength   []  Stage 4: Dynamic Strength and Agility   []  Stage 5: Sport Specific Training     []  Ready to Return to Play, Meets All Above Stages   []  Not Ready for Return to Sports   Comments:            Treatment/Activity Tolerance:  [x] Patient tolerated treatment well [] Patient limited by fatique  [] Patient limited by pain  [] Patient limited by other medical complications  [] Other:     Overall Progression Towards Functional goals/ Treatment Progress Update:  [] Patient is progressing as expected towards functional goals listed. [] Progression is slowed due to complexities/Impairments listed. [] Progression has been slowed due to co-morbidities. [x] Plan just implemented, too soon to assess goals progression <30days   [] Goals require adjustment due to lack of progress  [] Patient is not progressing as expected and requires additional follow up with physician  [] Other    Prognosis for POC: [x] Good [] Fair  [] Poor    Patient requires continued skilled intervention: [x] Yes  [] No        PLAN:   [x] Continue per plan of care [] Alter current plan (see comments)  [] Plan of care initiated [] Hold pending MD visit [] Discharge    Electronically signed by: Trina Francois PT    Note: If patient does not return for scheduled/recommended follow up visits, this note will serve as a discharge from care along with the most recent update on progress.

## 2021-10-04 ENCOUNTER — HOSPITAL ENCOUNTER (OUTPATIENT)
Dept: PHYSICAL THERAPY | Age: 71
Setting detail: THERAPIES SERIES
Discharge: HOME | End: 2021-10-04
Payer: MEDICARE

## 2021-10-04 PROCEDURE — 97110 THERAPEUTIC EXERCISES: CPT

## 2021-10-04 PROCEDURE — 97140 MANUAL THERAPY 1/> REGIONS: CPT

## 2021-10-04 NOTE — FLOWSHEET NOTE
[address] [place] [address]  Phone: [phone] Fax: [phone]    Physical Therapy Treatment Note/ Progress Report:     Date:  2021    Patient Name:  Darell Alston    :  1950  MRN: [id number]  Restrictions/Precautions:    Medical/Treatment Diagnosis Information:  Diagnosis: R foot pain, bilateral plantar fascitis  Treatment Diagnosis: bilateral plantar fascitis M72.2, R foot pain L58.070  Insurance/Certification information:  PT Insurance Information: Medicare//Aetna  Physician Information:  Referring Practitioner: Dr Taryn Joe of care signed (Y/N):     Date of Patient follow up with Physician:      Progress Report: [x]  Yes  []  No     Date Range for reporting period:  Beginnin2021  Ending:  -    Progress report due (10 Rx/or 30 days whichever is less):      Recertification due (POC duration/ or 90 days whichever is less): 10/1/2021     Visit # Insurance Allowable Auth Needed   7 Medicare/Aetna []Yes   [x]No     Latex Allergy:  [x]NO      []YES  Preferred Language for Healthcare:   [x]English       []other:  Functional Scale: LEFS 55% disability Date assessed:2021    Pain level: 3-4/10     SUBJECTIVE:  Patient reports that she is feeling some improvement in foot. Still has pain with shagging volleyballs, but not having as much discomfort at home- able to increase her time with volleyball before onset of pain. More aware of her arch position.         OBJECTIVE:     Current footwear: Ramone     LEFT RIGHT   Rearfoot position neutral neutral   Forefoot position neutral varus   1st Ray position neutral neutral   1st Ray mobility flexible flexible   Calcaneal position standing     Calcaneal position squatting     Total pronation       ROM LEFT RIGHT   HIP Flex     HIP Abd     HIP Ext     HIP IR     HIP ER     Knee ext     Knee Flex     DF knee bent [facility]   DF knee straight [facility] Strength  LEFT RIGHT   HIP Flexors     HIP Abductors     HIP Ext     Hip ER     Knee EXT (quad)     Knee Flex (HS)     Ankle DF     Ankle PF     Ankle Inv     Ankle EV       Deep Squat: dysfunctional, painful  SLS- eyes open: not tested  SLS- eyes closed: not tested  Foot strike: flat foot position  Pronation: accelerated         RESTRICTIONS/PRECAUTIONS: R THR     Exercises/Interventions:     Therapeutic Ex (88481)   Min: 25 Sets/sec Reps Notes/CUES   Retro Stepper/BIKE      Gastroc stretch 0     Standing posterior tib activation 1 15 bilat   Standing heel raise- rolled towel under met heads 1 15 Single leg   Seated toe curls  1 15 bilat   Ankle 4 way with red TB (latex free) 2 10 red   Clam ABD      Hip Ext Jay Battles      BOSU fwd/side lunge      BOSU squat      Leg Press Iso/Con/Ecc 0-      Cybex HS curl      TKE      Glute side walks      RDL      Slide Lunge      Slide HS eccentrics      Step ups/ecc step down      Swissball wall rolls- in SLS- hip drive      Quad hip ext/wall-ball rolls                  Manual Intervention (26232)  Min: 20 min      Knee mobs/PROM      Tib/Fem Mobs      Patella Mobs      Ankle mobs/STM 20                 NMR re-education (02795)  Min:   CUES NEEDED   Thai/Biofeedback 10/10      BFR      G. Med activation      Hip Ext full ROM/ G. Activation      Bosu Bal and Prop- G Med      Single leg stance/Balance/Prop      Bosu Retro G. Med act      Prone Hip froggers- sliders/elevated            Therapeutic Activity (41590)  Min:      Ladders      Plyos      Dynamic Balance                            Therapeutic Exercise and NMR EXR  [x] (27098) Provided verbal/tactile cueing for activities related to strengthening, flexibility, endurance, ROM for improvements in LE, proximal hip, and core control with self care, mobility, lifting, ambulation.   [x] (86365) Provided verbal/tactile cueing for activities related to improving balance, coordination, kinesthetic sense, posture, motor skill, proprioception  to assist with LE, proximal hip, and core control in self care, mobility, lifting, ambulation and eccentric single leg control. NMR and Therapeutic Activities:    [x] (30801 or 93500) Provided verbal/tactile cueing for activities related to improving balance, coordination, kinesthetic sense, posture, motor skill, proprioception and motor activation to allow for proper function of core, proximal hip and LE with self care and ADLs and functional mobility.   [] (75515) Gait Re-education- Provided training and instruction to the patient for proper LE, core and proximal hip recruitment and positioning and eccentric body weight control with ambulation re-education including up and down stairs     Home Exercise Program:    [x] (20290) Reviewed/Progressed HEP activities related to strengthening, flexibility, endurance, ROM of core, proximal hip and LE for functional self-care, mobility, lifting and ambulation/stair navigation   [] (45396)Reviewed/Progressed HEP activities related to improving balance, coordination, kinesthetic sense, posture, motor skill, proprioception of core, proximal hip and LE for self care, mobility, lifting, and ambulation/stair navigation      Manual Treatments:  PROM / STM / Oscillations-Mobs:  G-I, II, III, IV (PA's, Inf., Post.)  [x] (28333) Provided manual therapy to mobilize LE, proximal hip and/or LS spine soft tissue/joints for the purpose of modulating pain, promoting relaxation,  increasing ROM, reducing/eliminating soft tissue swelling/inflammation/restriction, improving soft tissue extensibility and allowing for proper ROM for normal function with self care, mobility, lifting and ambulation. Modalities:     [] GAME READY (VASO)- for significant edema, swelling, pain control.      Charges:  Timed Code Treatment Minutes: 45   Total Treatment Minutes: 45      [] EVAL (LOW) 03313 (typically 20 minutes face-to-face)  [] EVAL (MOD) 45786 (typically 30 minutes face-to-face)  [] EVAL (HIGH) 97584 (typically 45 minutes face-to-face)  [] RE-EVAL     [x] VN(55403) x  2 KX   [] DRY NEEDLE 1 OR 2 MUSCLES  [] NMR (32861) x     [] DRY NEEDLE 3+ MUSCLES  [x] Manual (72181) x 1  KX    [] TA (11976) x     [] Mech Traction (24809)  [] ES(attended) (37527)     [] ES (un) (83138):   [] VASO (40509)  [] Other:    If BWC Please Indicate Time In/Out  CPT Code Time in Time out                                   OALS:  Patient stated goal: walk without pain  [] Progressing: [] Met: [] Not Met: [] Adjusted    Therapist goals for Patient:   Short Term Goals: To be achieved in: 2 weeks  1. Independent in HEP and progression per patient tolerance, in order to prevent re-injury. [] Progressing: [] Met: [] Not Met: [] Adjusted  2. Patient will have a decrease in pain to facilitate improvement in movement, function, and ADLs as indicated by Functional Deficits. [] Progressing: [] Met: [] Not Met: [] Adjusted    Long Term Goals: To be achieved in: 4 weeks  1. Disability index score of 40% or less for the LEFS to assist with reaching prior level of function. [] Progressing: [] Met: [] Not Met: [] Adjusted  2. Patient will demonstrate increased AROM to 0-10 ankle DF to allow for proper joint functioning as indicated by patients Functional Deficits. [] Progressing: [] Met: [] Not Met: [] Adjusted  3. Patient will demonstrate an increase in Strength to good proximal hip strength and control, within 5lb HHD in LE to allow for proper functional mobility as indicated by patients Functional Deficits. [] Progressing: [] Met: [] Not Met: [] Adjusted  4. Patient will return to standing and walking functional activities without increased symptoms or restriction. [] Progressing: [] Met: [] Not Met: [] Adjusted  5.  Patient will report  that she is able to shag volleyballs for > 1 hr without increased symptoms or restriction. (patient specific functional goal)    [] Progressing: [] Met: [] Not Met: [] Adjusted     ASSESSMENT:  Patient tolerated all manual therapy, stretching and strengthening for R foot. No noted cramping of feet with exercises today after STM to affected areas. Not having near as much irritation into posterior tib or peroneals today. Patient improving in strength and also needing cues for improved form for proper activation of ankle musculature. Will continue to progress strengthening further next visit. Return to Play: (if applicable)   []  Stage 1: Intro to Strength   []  Stage 2: Return to Run and Strength   []  Stage 3: Return to Jump and Strength   []  Stage 4: Dynamic Strength and Agility   []  Stage 5: Sport Specific Training     []  Ready to Return to Play, Meets All Above Stages   []  Not Ready for Return to Sports   Comments:            Treatment/Activity Tolerance:  [x] Patient tolerated treatment well [] Patient limited by fatique  [] Patient limited by pain  [] Patient limited by other medical complications  [] Other:     Overall Progression Towards Functional goals/ Treatment Progress Update:  [] Patient is progressing as expected towards functional goals listed. [] Progression is slowed due to complexities/Impairments listed. [] Progression has been slowed due to co-morbidities.   [x] Plan just implemented, too soon to assess goals progression <30days   [] Goals require adjustment due to lack of progress  [] Patient is not progressing as expected and requires additional follow up with physician  [] Other    Prognosis for POC: [x] Good [] Fair  [] Poor    Patient requires continued skilled intervention: [x] Yes  [] No        PLAN:   [x] Continue per plan of care [] Alter current plan (see comments)  [] Plan of care initiated [] Hold pending MD visit [] Discharge    Electronically signed by: Venkat Gallegos, PT    Note: If patient does not return for scheduled/recommended follow up visits, this note will serve as a discharge from care along with the most recent update on progress.

## 2021-10-11 ENCOUNTER — HOSPITAL ENCOUNTER (OUTPATIENT)
Dept: PHYSICAL THERAPY | Age: 71
Setting detail: THERAPIES SERIES
Discharge: HOME | End: 2021-10-11
Payer: MEDICARE

## 2021-10-11 PROCEDURE — 97140 MANUAL THERAPY 1/> REGIONS: CPT

## 2021-10-11 PROCEDURE — 97110 THERAPEUTIC EXERCISES: CPT

## 2021-10-11 NOTE — FLOWSHEET NOTE
[facility] [address] [facility]  Phone: [phone] Fax: [phone]    Physical Therapy Treatment Note/ Progress Report:     Date:  2021    Patient Name:  Darell Alston    :  1950  MRN: [id number]  Restrictions/Precautions:    Medical/Treatment Diagnosis Information:  Diagnosis: R foot pain, bilateral plantar fascitis  Treatment Diagnosis: bilateral plantar fascitis M72.2, R foot pain I12.726  Insurance/Certification information:  PT Insurance Information: Medicare//Aetna  Physician Information:  Referring Practitioner: Dr Taryn Joe of care signed (Y/N):     Date of Patient follow up with Physician:      Progress Report: [x]  Yes  []  No     Date Range for reporting period:  Beginnin2021  Ending:  -    Progress report due (10 Rx/or 30 days whichever is less):      Recertification due (POC duration/ or 90 days whichever is less): 10/1/2021     Visit # Insurance Allowable Auth Needed   8 Medicare/Aetna []Yes   [x]No     Latex Allergy:  [x]NO      []YES  Preferred Language for Healthcare:   [x]English       []other:  Functional Scale: LEFS 55% disability Date assessed:2021    Pain level: 3-4/10     SUBJECTIVE:  Patient reports that she is feeling some improvement in foot during most of the day. . Still has pain with shagging volleyballs. Could feel after 30 min the other day, but continued for 2 hours. States that her foot really hurt after. Noting that she is having more discomfort across top of foot today. Not having as much discomfort at home.     OBJECTIVE:     Current footwear: Ramone     LEFT RIGHT   Rearfoot position neutral neutral   Forefoot position neutral varus   1st Ray position neutral neutral   1st Ray mobility flexible flexible   Calcaneal position standing     Calcaneal position squatting     Total pronation       ROM LEFT RIGHT   HIP Flex     HIP Abd     HIP Ext     HIP IR     HIP ER Knee ext     Knee Flex     [facility]   DF knee straight [facility] WFL             Strength  LEFT RIGHT   HIP Flexors     HIP Abductors     HIP Ext     Hip ER     Knee EXT (quad)     Knee Flex (HS)     Ankle DF     Ankle PF     Ankle Inv     Ankle EV       Deep Squat: dysfunctional, painful  SLS- eyes open: not tested  SLS- eyes closed: not tested  Foot strike: flat foot position  Pronation: accelerated         RESTRICTIONS/PRECAUTIONS: R THR     Exercises/Interventions:     Therapeutic Ex (01074)   Min: 25 Sets/sec Reps Notes/CUES   Retro Stepper/BIKE      Gastroc stretch 0     Standing posterior tib activation 1 15 bilat   Standing heel raise- rolled towel under met heads 1 15 Single leg   Seated toe curls  1 15 bilat   Ankle 4 way with red TB (latex free) 2 10 red   Standing SL corner rotation 1 15/each Support under arch- difficult         BOSU fwd/side lunge      BOSU squat      Leg Press Iso/Con/Ecc 0-      Cybex HS curl      TKE      Glute side walks      RDL      Slide Lunge      Slide HS eccentrics      Step ups/ecc step down      Swissball wall rolls- in SLS- hip drive      Quad hip ext/wall-ball rolls                  Manual Intervention (68948)  Min: 20 min      Knee mobs/PROM      Tib/Fem Mobs      Patella Mobs      Ankle mobs/STM 20                 NMR re-education (91218)  Min:   CUES NEEDED   Greenlandic/Biofeedback 10/10      BFR      G. Med activation      Hip Ext full ROM/ G. Activation      Bosu Bal and Prop- G Med      Single leg stance/Balance/Prop      Bosu Retro G. Med act      Prone Hip froggers- sliders/elevated            Therapeutic Activity (98468)  Min:      Ladders      Plyos      Dynamic Balance                            Therapeutic Exercise and NMR EXR  [x] (81888) Provided verbal/tactile cueing for activities related to strengthening, flexibility, endurance, ROM for improvements in LE, proximal hip, and core control with self care, mobility, lifting, ambulation.   [x] (34034) Provided verbal/tactile cueing for activities related to improving balance, coordination, kinesthetic sense, posture, motor skill, proprioception  to assist with LE, proximal hip, and core control in self care, mobility, lifting, ambulation and eccentric single leg control. NMR and Therapeutic Activities:    [x] (92539 or 08242) Provided verbal/tactile cueing for activities related to improving balance, coordination, kinesthetic sense, posture, motor skill, proprioception and motor activation to allow for proper function of core, proximal hip and LE with self care and ADLs and functional mobility.   [] (59659) Gait Re-education- Provided training and instruction to the patient for proper LE, core and proximal hip recruitment and positioning and eccentric body weight control with ambulation re-education including up and down stairs     Home Exercise Program:    [x] (36844) Reviewed/Progressed HEP activities related to strengthening, flexibility, endurance, ROM of core, proximal hip and LE for functional self-care, mobility, lifting and ambulation/stair navigation   [] (54406)Reviewed/Progressed HEP activities related to improving balance, coordination, kinesthetic sense, posture, motor skill, proprioception of core, proximal hip and LE for self care, mobility, lifting, and ambulation/stair navigation      Manual Treatments:  PROM / STM / Oscillations-Mobs:  G-I, II, III, IV (PA's, Inf., Post.)  [x] (45567) Provided manual therapy to mobilize LE, proximal hip and/or LS spine soft tissue/joints for the purpose of modulating pain, promoting relaxation,  increasing ROM, reducing/eliminating soft tissue swelling/inflammation/restriction, improving soft tissue extensibility and allowing for proper ROM for normal function with self care, mobility, lifting and ambulation. Modalities:     [] GAME READY (VASO)- for significant edema, swelling, pain control.      Charges:  Timed Code Treatment Minutes: 45   Total Treatment Minutes: 45      [] EVAL (LOW) 70390 (typically 20 minutes face-to-face)  [] EVAL (MOD) 75888 (typically 30 minutes face-to-face)  [] EVAL (HIGH) 03587 (typically 45 minutes face-to-face)  [] RE-EVAL     [x] NE(75282) x  2 KX   [] DRY NEEDLE 1 OR 2 MUSCLES  [] NMR (19066) x     [] DRY NEEDLE 3+ MUSCLES  [x] Manual (23370) x 1  KX    [] TA (13670) x     [] Mech Traction (50857)  [] ES(attended) (68331)     [] ES (un) (27219):   [] VASO (62277)  [] Other:    If BWC Please Indicate Time In/Out  CPT Code Time in Time out                                   OALS:  Patient stated goal: walk without pain  [] Progressing: [] Met: [] Not Met: [] Adjusted    Therapist goals for Patient:   Short Term Goals: To be achieved in: 2 weeks  1. Independent in HEP and progression per patient tolerance, in order to prevent re-injury. [] Progressing: [] Met: [] Not Met: [] Adjusted  2. Patient will have a decrease in pain to facilitate improvement in movement, function, and ADLs as indicated by Functional Deficits. [] Progressing: [] Met: [] Not Met: [] Adjusted    Long Term Goals: To be achieved in: 4 weeks  1. Disability index score of 40% or less for the LEFS to assist with reaching prior level of function. [] Progressing: [] Met: [] Not Met: [] Adjusted  2. Patient will demonstrate increased AROM to 0-10 ankle DF to allow for proper joint functioning as indicated by patients Functional Deficits. [] Progressing: [] Met: [] Not Met: [] Adjusted  3. Patient will demonstrate an increase in Strength to good proximal hip strength and control, within 5lb HHD in LE to allow for proper functional mobility as indicated by patients Functional Deficits. [] Progressing: [] Met: [] Not Met: [] Adjusted  4. Patient will return to standing and walking functional activities without increased symptoms or restriction. [] Progressing: [] Met: [] Not Met: [] Adjusted  5.  Patient will report  that she is able to shag volleyballs for > 1 hr without increased symptoms or restriction. (patient specific functional goal)    [] Progressing: [] Met: [] Not Met: [] Adjusted     ASSESSMENT:  Patient tolerated all manual therapy, stretching and strengthening for R foot. Patient with increased tenderness along mid foot and navicular today. More tightness in rear foot. Lessening overall soft tissue restriction along gastroc, posterior tib. Addressed more with form during resisted ankle exercises. Worked on progressing stability with rotational movements- needing support under arch form improved ability to perform task- however still very difficult. Will continue to progress strengthening. Still awaiting new orthotics. Return to Play: (if applicable)   []  Stage 1: Intro to Strength   []  Stage 2: Return to Run and Strength   []  Stage 3: Return to Jump and Strength   []  Stage 4: Dynamic Strength and Agility   []  Stage 5: Sport Specific Training     []  Ready to Return to Play, Meets All Above Stages   []  Not Ready for Return to Sports   Comments:            Treatment/Activity Tolerance:  [x] Patient tolerated treatment well [] Patient limited by fatique  [] Patient limited by pain  [] Patient limited by other medical complications  [] Other:     Overall Progression Towards Functional goals/ Treatment Progress Update:  [] Patient is progressing as expected towards functional goals listed. [] Progression is slowed due to complexities/Impairments listed. [] Progression has been slowed due to co-morbidities.   [x] Plan just implemented, too soon to assess goals progression <30days   [] Goals require adjustment due to lack of progress  [] Patient is not progressing as expected and requires additional follow up with physician  [] Other    Prognosis for POC: [x] Good [] Fair  [] Poor    Patient requires continued skilled intervention: [x] Yes  [] No        PLAN:   [x] Continue per plan of care [] Alter current plan (see comments)  [] Plan of care initiated [] Hold pending MD visit [] Discharge    Electronically signed by: Anna Zavala PT    Note: If patient does not return for scheduled/recommended follow up visits, this note will serve as a discharge from care along with the most recent update on progress.

## 2021-10-18 ENCOUNTER — HOSPITAL ENCOUNTER (OUTPATIENT)
Dept: PHYSICAL THERAPY | Age: 71
Setting detail: THERAPIES SERIES
Discharge: HOME | End: 2021-10-18
Payer: MEDICARE

## 2021-10-18 PROCEDURE — 97140 MANUAL THERAPY 1/> REGIONS: CPT

## 2021-10-18 PROCEDURE — 97110 THERAPEUTIC EXERCISES: CPT

## 2021-10-18 NOTE — FLOWSHEET NOTE
[facility] [address] [address] [place]  Phone: [phone] Fax: [phone]    Physical Therapy Treatment Note/ Progress Report:     Date:  2021    Patient Name:  Tony Ashby    :  1950  MRN: [id number]  Restrictions/Precautions:    Medical/Treatment Diagnosis Information:  Diagnosis: R foot pain, bilateral plantar fascitis  Treatment Diagnosis: bilateral plantar fascitis M72.2, R foot pain Z77.722  Insurance/Certification information:  PT Insurance Information: Medicare//Aetna  Physician Information:  Referring Practitioner: Dr Jai Green of care signed (Y/N):     Date of Patient follow up with Physician:      Progress Report: [x]  Yes  []  No     Date Range for reporting period:  Beginnin2021  Ending:  -    Progress report due (10 Rx/or 30 days whichever is less):      Recertification due (POC duration/ or 90 days whichever is less): 10/1/2021     Visit # Insurance Allowable Auth Needed   9 Medicare/Aetna []Yes   [x]No     Latex Allergy:  [x]NO      []YES  Preferred Language for Healthcare:   [x]English       []other:  Functional Scale: LEFS 55% disability Date assessed:2021    Pain level: 3-4/10     SUBJECTIVE:  Patient reports that she is feeling some improvement in foot during most of the day. Still has pain with shagging volleyballs and in her arch at end of the day. Not having as much issue with bunion and corn.      OBJECTIVE:     Current footwear: Ramone     LEFT RIGHT   Rearfoot position neutral neutral   Forefoot position neutral varus   1st Ray position neutral neutral   1st Ray mobility flexible flexible   Calcaneal position standing     Calcaneal position squatting     Total pronation       ROM LEFT RIGHT   HIP Flex     HIP Abd     HIP Ext     HIP IR     HIP ER     Knee ext     Knee Flex     DF knee bent [facility]   DF knee straight [facility] WFL             Strength  LEFT RIGHT   HIP Flexors     HIP Abductors     HIP Ext     Hip ER     Knee EXT (quad)     Knee Flex (HS)     Ankle DF     Ankle PF     Ankle Inv     Ankle EV       Deep Squat: dysfunctional, painful  SLS- eyes open: not tested  SLS- eyes closed: not tested  Foot strike: flat foot position  Pronation: accelerated         RESTRICTIONS/PRECAUTIONS: R THR     Exercises/Interventions:     Therapeutic Ex (71041)   Min: 25 Sets/sec Reps Notes/CUES   Retro Stepper/BIKE      Gastroc stretch 0     Standing posterior tib activation 1 15 bilat   Standing heel raise- rolled towel under met heads 1 15 Single leg   Seated toe curls  1 15 bilat   Ankle 4 way with red TB (latex free) 2 10 red   Standing SL corner rotation 1 15/each Support under arch- difficult         BOSU fwd/side lunge      BOSU squat      Leg Press Iso/Con/Ecc 0-      Cybex HS curl      TKE      Glute side walks      RDL      Slide Lunge      Slide HS eccentrics      Step ups/ecc step down      Swissball wall rolls- in SLS- hip drive      Quad hip ext/wall-ball rolls                  Manual Intervention (26155)  Min: 20 min      Knee mobs/PROM      Tib/Fem Mobs      Patella Mobs      Ankle mobs/STM 20                 NMR re-education (43127)  Min:   CUES NEEDED   Panamanian/Biofeedback 10/10      BFR      G. Med activation      Hip Ext full ROM/ G. Activation      Bosu Bal and Prop- G Med      Single leg stance/Balance/Prop      Bosu Retro G. Med act      Prone Hip froggers- sliders/elevated            Therapeutic Activity (29245)  Min:      Ladders      Plyos      Dynamic Balance                            Therapeutic Exercise and NMR EXR  [x] (65986) Provided verbal/tactile cueing for activities related to strengthening, flexibility, endurance, ROM for improvements in LE, proximal hip, and core control with self care, mobility, lifting, ambulation.   [x] (21086) Provided verbal/tactile cueing for activities related to improving balance, coordination, kinesthetic sense, posture, motor skill, proprioception  to assist with LE, proximal hip, and core control in self care, mobility, lifting, ambulation and eccentric single leg control. NMR and Therapeutic Activities:    [x] (62778 or 60496) Provided verbal/tactile cueing for activities related to improving balance, coordination, kinesthetic sense, posture, motor skill, proprioception and motor activation to allow for proper function of core, proximal hip and LE with self care and ADLs and functional mobility.   [] (56009) Gait Re-education- Provided training and instruction to the patient for proper LE, core and proximal hip recruitment and positioning and eccentric body weight control with ambulation re-education including up and down stairs     Home Exercise Program:    [x] (07304) Reviewed/Progressed HEP activities related to strengthening, flexibility, endurance, ROM of core, proximal hip and LE for functional self-care, mobility, lifting and ambulation/stair navigation   [] (73790)Reviewed/Progressed HEP activities related to improving balance, coordination, kinesthetic sense, posture, motor skill, proprioception of core, proximal hip and LE for self care, mobility, lifting, and ambulation/stair navigation      Manual Treatments:  PROM / STM / Oscillations-Mobs:  G-I, II, III, IV (PA's, Inf., Post.)  [x] (07181) Provided manual therapy to mobilize LE, proximal hip and/or LS spine soft tissue/joints for the purpose of modulating pain, promoting relaxation,  increasing ROM, reducing/eliminating soft tissue swelling/inflammation/restriction, improving soft tissue extensibility and allowing for proper ROM for normal function with self care, mobility, lifting and ambulation. Modalities:     [] GAME READY (VASO)- for significant edema, swelling, pain control.      Charges:  Timed Code Treatment Minutes: 45   Total Treatment Minutes: 45      [] EVAL (LOW) 32650 (typically 20 minutes face-to-face)  [] EVAL (MOD) 01193 (typically 30 minutes face-to-face)  [] EVAL (HIGH) 73567 (typically 45 minutes face-to-face)  [] RE-EVAL     [x] MA(90289) x  2 KX   [] DRY NEEDLE 1 OR 2 MUSCLES  [] NMR (43248) x     [] DRY NEEDLE 3+ MUSCLES  [x] Manual (75388) x 1  KX    [] TA (87982) x     [] Mech Traction (62202)  [] ES(attended) (72544)     [] ES (un) (62695):   [] VASO (33628)  [] Other:    If BWC Please Indicate Time In/Out  CPT Code Time in Time out                                   OALS:  Patient stated goal: walk without pain  [] Progressing: [] Met: [] Not Met: [] Adjusted    Therapist goals for Patient:   Short Term Goals: To be achieved in: 2 weeks  1. Independent in HEP and progression per patient tolerance, in order to prevent re-injury. [] Progressing: [] Met: [] Not Met: [] Adjusted  2. Patient will have a decrease in pain to facilitate improvement in movement, function, and ADLs as indicated by Functional Deficits. [] Progressing: [] Met: [] Not Met: [] Adjusted    Long Term Goals: To be achieved in: 4 weeks  1. Disability index score of 40% or less for the LEFS to assist with reaching prior level of function. [] Progressing: [] Met: [] Not Met: [] Adjusted  2. Patient will demonstrate increased AROM to 0-10 ankle DF to allow for proper joint functioning as indicated by patients Functional Deficits. [] Progressing: [] Met: [] Not Met: [] Adjusted  3. Patient will demonstrate an increase in Strength to good proximal hip strength and control, within 5lb HHD in LE to allow for proper functional mobility as indicated by patients Functional Deficits. [] Progressing: [] Met: [] Not Met: [] Adjusted  4. Patient will return to standing and walking functional activities without increased symptoms or restriction. [] Progressing: [] Met: [] Not Met: [] Adjusted  5.  Patient will report  that she is able to shag volleyballs for > 1 hr without increased symptoms or restriction. (patient specific functional goal)    [] Progressing: [] Met: [] Not Met: [] Adjusted     ASSESSMENT:  Patient tolerated all manual therapy, stretching and strengthening for R foot. Patient with less overall tenderness along mid foot and navicular today. Tight in rear foot. Lessening overall soft tissue restriction along gastroc, posterior tib. Addressed more with form during resisted ankle exercises. Worked on progressing stability with rotational movements. Will continue to progress strengthening. Still awaiting new orthotics. Return to Play: (if applicable)   []  Stage 1: Intro to Strength   []  Stage 2: Return to Run and Strength   []  Stage 3: Return to Jump and Strength   []  Stage 4: Dynamic Strength and Agility   []  Stage 5: Sport Specific Training     []  Ready to Return to Play, Meets All Above Stages   []  Not Ready for Return to Sports   Comments:            Treatment/Activity Tolerance:  [x] Patient tolerated treatment well [] Patient limited by fatique  [] Patient limited by pain  [] Patient limited by other medical complications  [] Other:     Overall Progression Towards Functional goals/ Treatment Progress Update:  [] Patient is progressing as expected towards functional goals listed. [] Progression is slowed due to complexities/Impairments listed. [] Progression has been slowed due to co-morbidities.   [x] Plan just implemented, too soon to assess goals progression <30days   [] Goals require adjustment due to lack of progress  [] Patient is not progressing as expected and requires additional follow up with physician  [] Other    Prognosis for POC: [x] Good [] Fair  [] Poor    Patient requires continued skilled intervention: [x] Yes  [] No        PLAN:   [x] Continue per plan of care [] Alter current plan (see comments)  [] Plan of care initiated [] Hold pending MD visit [] Discharge    Electronically signed by: Pricila Butler PT    Note: If patient does not return for scheduled/recommended follow up visits, this note will serve as a discharge from care along with

## 2021-10-26 ENCOUNTER — HOSPITAL ENCOUNTER (OUTPATIENT)
Dept: PHYSICAL THERAPY | Age: 71
Setting detail: THERAPIES SERIES
Discharge: HOME | End: 2021-10-26
Payer: MEDICARE

## 2021-10-26 PROCEDURE — 97110 THERAPEUTIC EXERCISES: CPT

## 2021-10-26 PROCEDURE — 97140 MANUAL THERAPY 1/> REGIONS: CPT

## 2021-10-26 NOTE — FLOWSHEET NOTE
[place] [address] [address]  Phone: [phone] Fax: [phone]    Physical Therapy Treatment Note/ Progress Report:     Date:  2021    Patient Name:  Juve Burch    :  1950  MRN: [id number]  Restrictions/Precautions:    Medical/Treatment Diagnosis Information:  Diagnosis: R foot pain, bilateral plantar fascitis  Treatment Diagnosis: bilateral plantar fascitis M72.2, R foot pain R06.388  Insurance/Certification information:  PT Insurance Information: Medicare//Aetna  Physician Information:  Referring Practitioner: Dr Ramirez Bell of care signed (Y/N):     Date of Patient follow up with Physician:      Progress Report: [x]  Yes  []  No     Date Range for reporting period:  Beginnin2021  Ending:  -    Progress report due (10 Rx/or 30 days whichever is less):      Recertification due (POC duration/ or 90 days whichever is less): 10/1/2021     Visit # Insurance Allowable Auth Needed   10 Medicare/Aetna []Yes   [x]No     Latex Allergy:  [x]NO      []YES  Preferred Language for Healthcare:   [x]English       []other:  Functional Scale: LEFS 55% disability Date assessed:2021    Pain level: 3-4/10     SUBJECTIVE:  Patient reports that she is feeling some improvement in foot during most of the day. Has been very stressed out from other things going on in life, so did not go to shag balls yesterday or today.       OBJECTIVE:     Current footwear: Ramone     LEFT RIGHT   Rearfoot position neutral neutral   Forefoot position neutral varus   1st Ray position neutral neutral   1st Ray mobility flexible flexible   Calcaneal position standing     Calcaneal position squatting     Total pronation       ROM LEFT RIGHT   HIP Flex     HIP Abd     HIP Ext     HIP IR     HIP ER     Knee ext     Knee Flex     DF knee bent [facility]   DF knee straight [facility] WFL             Strength  LEFT RIGHT   HIP Flexors     HIP Abductors HIP Ext     Hip ER     Knee EXT (quad)     Knee Flex (HS)     Ankle DF     Ankle PF     Ankle Inv     Ankle EV       Deep Squat: dysfunctional, painful  SLS- eyes open: not tested  SLS- eyes closed: not tested  Foot strike: flat foot position  Pronation: accelerated         RESTRICTIONS/PRECAUTIONS: R THR     Exercises/Interventions:     Therapeutic Ex (95660)   Min: 25 Sets/sec Reps Notes/CUES   Retro Stepper/BIKE      Gastroc stretch 0     Standing posterior tib activation 1 15 bilat   Standing heel raise- rolled towel under met heads 1 15 Single leg   Seated toe curls  0  bilat   Ankle 4 way with red TB (latex free) 2 10 red   Standing SL corner rotation 1 15/each Support under arch- difficult   Trial of shoes with orthotics 1 5min    BOSU fwd/side lunge      BOSU squat      Leg Press Iso/Con/Ecc 0-      Cybex HS curl      TKE      Glute side walks      RDL      Slide Lunge      Slide HS eccentrics      Step ups/ecc step down      Swissball wall rolls- in SLS- hip drive      Quad hip ext/wall-ball rolls                  Manual Intervention (93367)  Min: 20 min      Knee mobs/PROM      Tib/Fem Mobs      Patella Mobs      Ankle mobs/STM 20                 NMR re-education (71693)  Min:   CUES NEEDED   [place]/Biofeedback 10/10      BFR      G. Med activation      Hip Ext full ROM/ G. Activation      Bosu Bal and Prop- G Med      Single leg stance/Balance/Prop      Bosu Retro G. Med act      Prone Hip froggers- sliders/elevated            Therapeutic Activity (15535)  Min:      Ladders      Plyos      Dynamic Balance                            Therapeutic Exercise and NMR EXR  [x] (58582) Provided verbal/tactile cueing for activities related to strengthening, flexibility, endurance, ROM for improvements in LE, proximal hip, and core control with self care, mobility, lifting, ambulation.   [x] (42712) Provided verbal/tactile cueing for activities related to improving balance, coordination, kinesthetic sense, posture, motor skill, proprioception  to assist with LE, proximal hip, and core control in self care, mobility, lifting, ambulation and eccentric single leg control. NMR and Therapeutic Activities:    [x] (56158 or 04579) Provided verbal/tactile cueing for activities related to improving balance, coordination, kinesthetic sense, posture, motor skill, proprioception and motor activation to allow for proper function of core, proximal hip and LE with self care and ADLs and functional mobility.   [] (41062) Gait Re-education- Provided training and instruction to the patient for proper LE, core and proximal hip recruitment and positioning and eccentric body weight control with ambulation re-education including up and down stairs     Home Exercise Program:    [x] (68735) Reviewed/Progressed HEP activities related to strengthening, flexibility, endurance, ROM of core, proximal hip and LE for functional self-care, mobility, lifting and ambulation/stair navigation   [] (21631)Reviewed/Progressed HEP activities related to improving balance, coordination, kinesthetic sense, posture, motor skill, proprioception of core, proximal hip and LE for self care, mobility, lifting, and ambulation/stair navigation      Manual Treatments:  PROM / STM / Oscillations-Mobs:  G-I, II, III, IV (PA's, Inf., Post.)  [x] (86917) Provided manual therapy to mobilize LE, proximal hip and/or LS spine soft tissue/joints for the purpose of modulating pain, promoting relaxation,  increasing ROM, reducing/eliminating soft tissue swelling/inflammation/restriction, improving soft tissue extensibility and allowing for proper ROM for normal function with self care, mobility, lifting and ambulation. Modalities:     [] GAME READY (VASO)- for significant edema, swelling, pain control.      Charges:  Timed Code Treatment Minutes: 45   Total Treatment Minutes: 45      [] EVAL (LOW) 61392 (typically 20 minutes face-to-face)  [] EVAL (MOD) 97651 (typically 30 minutes face-to-face)  [] EVAL (HIGH) 23458 (typically 45 minutes face-to-face)  [] RE-EVAL     [x] CQ(70407) x  2 KX   [] DRY NEEDLE 1 OR 2 MUSCLES  [] NMR (61727) x     [] DRY NEEDLE 3+ MUSCLES  [x] Manual (72276) x 1  KX    [] TA (84938) x     [] Mech Traction (68713)  [] ES(attended) (05963)     [] ES (un) (31169):   [] VASO (60778)  [] Other:    If BWC Please Indicate Time In/Out  CPT Code Time in Time out                                   OALS:  Patient stated goal: walk without pain  [] Progressing: [] Met: [] Not Met: [] Adjusted    Therapist goals for Patient:   Short Term Goals: To be achieved in: 2 weeks  1. Independent in HEP and progression per patient tolerance, in order to prevent re-injury. [] Progressing: [] Met: [] Not Met: [] Adjusted  2. Patient will have a decrease in pain to facilitate improvement in movement, function, and ADLs as indicated by Functional Deficits. [] Progressing: [] Met: [] Not Met: [] Adjusted    Long Term Goals: To be achieved in: 4 weeks  1. Disability index score of 40% or less for the LEFS to assist with reaching prior level of function. [] Progressing: [] Met: [] Not Met: [] Adjusted  2. Patient will demonstrate increased AROM to 0-10 ankle DF to allow for proper joint functioning as indicated by patients Functional Deficits. [] Progressing: [] Met: [] Not Met: [] Adjusted  3. Patient will demonstrate an increase in Strength to good proximal hip strength and control, within 5lb HHD in LE to allow for proper functional mobility as indicated by patients Functional Deficits. [] Progressing: [] Met: [] Not Met: [] Adjusted  4. Patient will return to standing and walking functional activities without increased symptoms or restriction. [] Progressing: [] Met: [] Not Met: [] Adjusted  5.  Patient will report  that she is able to shag volleyballs for > 1 hr without increased symptoms or restriction. (patient specific functional goal)    [] Progressing: [] Met: [] Not Met: [] Adjusted     ASSESSMENT:  Trialed new shoes with orthotics. Patient R shoe a bit too small and L shoe too large for patient. Not enough room in R toe box. Patient sore in posterior tibialis today. Good overall tolerance to ankle strengthening. Will trial another pair of shoes next week with orthotics. Needs further work on progressing stability with rotational movements. Will continue to progress strengthening. Return to Play: (if applicable)   []  Stage 1: Intro to Strength   []  Stage 2: Return to Run and Strength   []  Stage 3: Return to Jump and Strength   []  Stage 4: Dynamic Strength and Agility   []  Stage 5: Sport Specific Training     []  Ready to Return to Play, Meets All Above Stages   []  Not Ready for Return to Sports   Comments:            Treatment/Activity Tolerance:  [x] Patient tolerated treatment well [] Patient limited by fatique  [] Patient limited by pain  [] Patient limited by other medical complications  [] Other:     Overall Progression Towards Functional goals/ Treatment Progress Update:  [] Patient is progressing as expected towards functional goals listed. [] Progression is slowed due to complexities/Impairments listed. [] Progression has been slowed due to co-morbidities. [x] Plan just implemented, too soon to assess goals progression <30days   [] Goals require adjustment due to lack of progress  [] Patient is not progressing as expected and requires additional follow up with physician  [] Other    Prognosis for POC: [x] Good [] Fair  [] Poor    Patient requires continued skilled intervention: [x] Yes  [] No        PLAN:   [x] Continue per plan of care [] Alter current plan (see comments)  [] Plan of care initiated [] Hold pending MD visit [] Discharge    Electronically signed by: Laury Homans, PT    Note: If patient does not return for scheduled/recommended follow up visits, this note will serve as a discharge from care along with the most recent update on progress.

## 2021-11-01 ENCOUNTER — HOSPITAL ENCOUNTER (OUTPATIENT)
Dept: PHYSICAL THERAPY | Age: 71
Setting detail: THERAPIES SERIES
Discharge: HOME | End: 2021-11-01
Payer: MEDICARE

## 2021-11-01 PROCEDURE — 97110 THERAPEUTIC EXERCISES: CPT

## 2021-11-01 PROCEDURE — 97140 MANUAL THERAPY 1/> REGIONS: CPT

## 2021-11-01 NOTE — FLOWSHEET NOTE
[place] [address] [address]  Phone: [phone] Fax: [phone]    Physical Therapy Treatment Note/ Progress Report:     Date:  2021    Patient Name:  Johnnie Metz    :  1950  MRN: [id number]  Restrictions/Precautions:    Medical/Treatment Diagnosis Information:  Diagnosis: R foot pain, bilateral plantar fascitis  Treatment Diagnosis: bilateral plantar fascitis M72.2, R foot pain L97.505  Insurance/Certification information:  PT Insurance Information: Medicare//Aetna  Physician Information:  Referring Practitioner: Dr Samantha Layne of care signed (Y/N):     Date of Patient follow up with Physician:      Progress Report: [x]  Yes  []  No     Date Range for reporting period:  Beginnin2021  Ending:  -    Progress report due (10 Rx/or 30 days whichever is less):      Recertification due (POC duration/ or 90 days whichever is less): 10/1/2021     Visit # Insurance Allowable Auth Needed   11 Medicare/Aetna []Yes   [x]No     Latex Allergy:  [x]NO      []YES  Preferred Language for Healthcare:   [x]English       []other:  Functional Scale: LEFS 55% disability Date assessed:2021    Pain level: 3-4/10     SUBJECTIVE:  Patient reports that she is feeling some improvement in foot during most of the day. Did not shag balls over the past few days.      OBJECTIVE:     Current footwear: Ramone     LEFT RIGHT   Rearfoot position neutral neutral   Forefoot position neutral varus   1st Ray position neutral neutral   1st Ray mobility flexible flexible   Calcaneal position standing     Calcaneal position squatting     Total pronation       ROM LEFT RIGHT   HIP Flex     HIP Abd     HIP Ext     HIP IR     HIP ER     Knee ext     Knee Flex     DF knee bent [facility]   DF knee straight [facility] WFL             Strength  LEFT RIGHT   HIP Flexors     HIP Abductors     HIP Ext     Hip ER     Knee EXT (quad)     Knee Flex (HS) Ankle DF     Ankle PF     Ankle Inv     Ankle EV       Deep Squat: dysfunctional, painful  SLS- eyes open: not tested  SLS- eyes closed: not tested  Foot strike: flat foot position  Pronation: accelerated         RESTRICTIONS/PRECAUTIONS: R THR     Exercises/Interventions:     Therapeutic Ex (23017)   Min: 25 Sets/sec Reps Notes/CUES   Retro Stepper/BIKE      Gastroc stretch 0     Standing posterior tib activation 1 15 bilat   Standing toe and heel raise-with shoe and orthotic donned 1 15 double leg   Seated toe curls  0  bilat   Ankle 4 way with blue TB (latex free) 2 10    Standing SL corner rotation 1 15/each Support under arch- difficult   Ambulation with shoes/orthotics 1 5min    Quick rotation at corner 1 10/each    BOSU fwd/side lunge      BOSU squat      Leg Press Iso/Con/Ecc 0-      Cybex HS curl      TKE      Glute side walks      RDL      Slide Lunge      Slide HS eccentrics      Step ups/ecc step down      Swissball wall rolls- in SLS- hip drive      Quad hip ext/wall-ball rolls                  Manual Intervention (53177)  Min: 20 min      Knee mobs/PROM      Tib/Fem Mobs      Patella Mobs      Ankle mobs/STM 20                 NMR re-education (30558)  Min:   CUES NEEDED   [place]/Biofeedback 10/10      BFR      G. Med activation      Hip Ext full ROM/ G. Activation      Bosu Bal and Prop- G Med      Single leg stance/Balance/Prop      Bosu Retro G. Med act      Prone Hip froggers- sliders/elevated            Therapeutic Activity (62604)  Min:      Ladders      Plyos      Dynamic Balance                            Therapeutic Exercise and NMR EXR  [x] (21927) Provided verbal/tactile cueing for activities related to strengthening, flexibility, endurance, ROM for improvements in LE, proximal hip, and core control with self care, mobility, lifting, ambulation.   [x] (88797) Provided verbal/tactile cueing for activities related to improving balance, coordination, kinesthetic sense, posture, motor skill, proprioception  to assist with LE, proximal hip, and core control in self care, mobility, lifting, ambulation and eccentric single leg control. NMR and Therapeutic Activities:    [x] (86070 or 06659) Provided verbal/tactile cueing for activities related to improving balance, coordination, kinesthetic sense, posture, motor skill, proprioception and motor activation to allow for proper function of core, proximal hip and LE with self care and ADLs and functional mobility.   [] (20940) Gait Re-education- Provided training and instruction to the patient for proper LE, core and proximal hip recruitment and positioning and eccentric body weight control with ambulation re-education including up and down stairs     Home Exercise Program:    [x] (46173) Reviewed/Progressed HEP activities related to strengthening, flexibility, endurance, ROM of core, proximal hip and LE for functional self-care, mobility, lifting and ambulation/stair navigation   [] (97038)Reviewed/Progressed HEP activities related to improving balance, coordination, kinesthetic sense, posture, motor skill, proprioception of core, proximal hip and LE for self care, mobility, lifting, and ambulation/stair navigation      Manual Treatments:  PROM / STM / Oscillations-Mobs:  G-I, II, III, IV (PA's, Inf., Post.)  [x] (67407) Provided manual therapy to mobilize LE, proximal hip and/or LS spine soft tissue/joints for the purpose of modulating pain, promoting relaxation,  increasing ROM, reducing/eliminating soft tissue swelling/inflammation/restriction, improving soft tissue extensibility and allowing for proper ROM for normal function with self care, mobility, lifting and ambulation. Modalities:     [] GAME READY (VASO)- for significant edema, swelling, pain control.      Charges:  Timed Code Treatment Minutes: 45   Total Treatment Minutes: 45      [] EVAL (LOW) 31376 (typically 20 minutes face-to-face)  [] EVAL (MOD) 51372 (typically 30 minutes face-to-face)  [] EVAL (HIGH) 33333 (typically 45 minutes face-to-face)  [] RE-EVAL     [x] XX(16311) x  2 KX   [] DRY NEEDLE 1 OR 2 MUSCLES  [] NMR (32875) x     [] DRY NEEDLE 3+ MUSCLES  [x] Manual (04435) x 1  KX    [] TA (11738) x     [] Mech Traction (46014)  [] ES(attended) (85170)     [] ES (un) (70885):   [] VASO (51957)  [] Other:    If BWC Please Indicate Time In/Out  CPT Code Time in Time out                                   OALS:  Patient stated goal: walk without pain  [] Progressing: [] Met: [] Not Met: [] Adjusted    Therapist goals for Patient:   Short Term Goals: To be achieved in: 2 weeks  1. Independent in HEP and progression per patient tolerance, in order to prevent re-injury. [] Progressing: [] Met: [] Not Met: [] Adjusted  2. Patient will have a decrease in pain to facilitate improvement in movement, function, and ADLs as indicated by Functional Deficits. [] Progressing: [] Met: [] Not Met: [] Adjusted    Long Term Goals: To be achieved in: 4 weeks  1. Disability index score of 40% or less for the LEFS to assist with reaching prior level of function. [] Progressing: [] Met: [] Not Met: [] Adjusted  2. Patient will demonstrate increased AROM to 0-10 ankle DF to allow for proper joint functioning as indicated by patients Functional Deficits. [] Progressing: [] Met: [] Not Met: [] Adjusted  3. Patient will demonstrate an increase in Strength to good proximal hip strength and control, within 5lb HHD in LE to allow for proper functional mobility as indicated by patients Functional Deficits. [] Progressing: [] Met: [] Not Met: [] Adjusted  4. Patient will return to standing and walking functional activities without increased symptoms or restriction. [] Progressing: [] Met: [] Not Met: [] Adjusted  5.  Patient will report  that she is able to shag volleyballs for > 1 hr without increased symptoms or restriction. (patient specific functional goal)    [] Progressing: [] Met: [] Not Met: [] Adjusted     ASSESSMENT:  Trialed new shoes with orthotics again. Patient more receptive to shoes today and worked on lengthening stride length for improved overall gait pattern with heel strike and toe off. Patient educated on progression of weaning into orthotics as well as signs to look for if rubbing, etc. Patient tolerated all ankle strengthening and working further into stability with rotational movements. Will continue to progress strengthening. Return to Play: (if applicable)   []  Stage 1: Intro to Strength   []  Stage 2: Return to Run and Strength   []  Stage 3: Return to Jump and Strength   []  Stage 4: Dynamic Strength and Agility   []  Stage 5: Sport Specific Training     []  Ready to Return to Play, Meets All Above Stages   []  Not Ready for Return to Sports   Comments:            Treatment/Activity Tolerance:  [x] Patient tolerated treatment well [] Patient limited by fatique  [] Patient limited by pain  [] Patient limited by other medical complications  [] Other:     Overall Progression Towards Functional goals/ Treatment Progress Update:  [] Patient is progressing as expected towards functional goals listed. [] Progression is slowed due to complexities/Impairments listed. [] Progression has been slowed due to co-morbidities.   [x] Plan just implemented, too soon to assess goals progression <30days   [] Goals require adjustment due to lack of progress  [] Patient is not progressing as expected and requires additional follow up with physician  [] Other    Prognosis for POC: [x] Good [] Fair  [] Poor    Patient requires continued skilled intervention: [x] Yes  [] No        PLAN:   [x] Continue per plan of care [] Alter current plan (see comments)  [] Plan of care initiated [] Hold pending MD visit [] Discharge    Electronically signed by: Skyla Kwan PT    Note: If patient does not return for scheduled/recommended follow up visits, this note will serve as a discharge from care along with the most recent update on progress.

## 2021-11-08 ENCOUNTER — HOSPITAL ENCOUNTER (OUTPATIENT)
Dept: PHYSICAL THERAPY | Age: 71
Setting detail: THERAPIES SERIES
Discharge: HOME | End: 2021-11-08
Payer: MEDICARE

## 2021-11-08 PROCEDURE — 97110 THERAPEUTIC EXERCISES: CPT

## 2021-11-08 PROCEDURE — 97140 MANUAL THERAPY 1/> REGIONS: CPT

## 2021-11-08 NOTE — PLAN OF CARE
[address] [address]  Phone: [phone] Fax: [phone]        Physical Therapy Re-Certification Plan of Care/MD UPDATE      Dear Referring Practitioner: Dr. Kristan Betancur,    We had the pleasure of treating the following patient for physical therapy services at [address] [place] [address]. A summary of our findings can be found in the updated assessment below. This includes our plan of care. If you have any questions or concerns regarding these findings, please do not hesitate to contact me at the office phone number checked above.   Thank you for the referral.     Physician Signature:________________________________Date:__________________  By signing above (or electronic signature), therapists plan is approved by physician    Date Range Of Visits: 2021 thru 2021  Total Visits to Date: 12  Overall Response to Treatment:   [x]Patient is responding well to treatment and improvement is noted with regards  to goals   []Patient should continue to improve in reasonable time if they continue HEP   []Patient has plateaued and is no longer responding to skilled PT intervention    []Patient is getting worse and would benefit from return to referring MD   []Patient unable to adhere to initial POC   []Other:       Physical Therapy Treatment Note/ Progress Report:     Date:  2021    Patient Name:  Chris Hernandez    :  1950  MRN: [id number]  Restrictions/Precautions:    Medical/Treatment Diagnosis Information:  Diagnosis: R foot pain, bilateral plantar fascitis  Treatment Diagnosis: bilateral plantar fascitis M72.2, R foot pain N77.395  Insurance/Certification information:  PT Insurance Information: Medicare//Aetna  Physician Information:  Referring Practitioner: Dr Latonia Lewis of care signed (Y/N):     Date of Patient follow up with Physician:      Progress Report: [x]  Yes  []  No     Date Range for reporting period:  Beginnin2021  Endin2021    Progress report due (10 Rx/or 30 days whichever is less): 6217    Recertification due (POC duration/ or 90 days whichever is less): 2021     Visit # Insurance Allowable Auth Needed   12 Medicare/Aetna []Yes   [x]No     Latex Allergy:  [x]NO      []YES  Preferred Language for Healthcare:   [x]English       []other:  Functional Scale: LEFS 50% disability Date assessed:2021    Pain level: 3-410     SUBJECTIVE:  Patient reports that she is feeling some improvement in foot during most of the day. Has made it up to about 8 hours of wearing the new orthotics. Feels like ankles still are getting stronger, but notes that her ankles aren't as strong as she needs them to be yet as she still is uneasy about walking at times due to weakness. Still shagging volleyballs in her old shoes. Wore old shoes to a banquet over the weekend. States that they hurt her quite a bit and put the new shoes with orthotics on, which helped decrease pain.       OBJECTIVE:     Current footwear: Ramone  ROM LEFT RIGHT     Ankle PF 60 70   Ankle DF 8 5   Ankle In 15 50   Ankle Ev 15 20   Strength  LEFT RIGHT   Ankle DF  5 4+   Ankle PF  5 4+   Ankle Inv  4+ 4+   Ankle EV  4+ 4          LEFT RIGHT   Rearfoot position neutral neutral   Forefoot position neutral varus   1st Ray position neutral neutral   1st Ray mobility flexible flexible   Calcaneal position standing     Calcaneal position squatting     Total pronation       ROM LEFT RIGHT   [facility]   DF knee straight WFL WFL               Deep Squat: dysfunctional, painful  SLS- eyes open: not tested  SLS- eyes closed: not tested  Foot strike: flat foot position  Pronation: accelerated      RESTRICTIONS/PRECAUTIONS: R THR     Exercises/Interventions:     Therapeutic Ex (10403)   Min: 25 Sets/sec Reps Notes/CUES   Retro Stepper/BIKE      Gastroc stretch 0     Standing posterior tib activation 0  bilat   Standing toe and heel raise-with shoe and orthotic donned 1 15 double leg   Seated toe curls  0  bilat   Ankle 4 way with blue TB (latex free) 2 10    Standing SL corner rotation 1 15/each Support under arch- difficult   Ambulation with shoes/orthotics 1 5min    Quick rotation at corner 1 10/each    BOSU fwd/side lunge      BOSU squat      Leg Press Iso/Con/Ecc 0-      Cybex HS curl      TKE      Glute side walks      RDL      Slide Lunge      Slide HS eccentrics      Step ups/ecc step down      Swissball wall rolls- in SLS- hip drive      Quad hip ext/wall-ball rolls                  Manual Intervention (80918)  Min: 20 min      Knee mobs/PROM      Tib/Fem Mobs      Patella Mobs      Ankle mobs/STM 20                 NMR re-education (74791)  Min:   CUES NEEDED   Slovak/Biofeedback 10/10      BFR      G. Med activation      Hip Ext full ROM/ G. Activation      Bosu Bal and Prop- G Med      Single leg stance/Balance/Prop      Bosu Retro G. Med act      Prone Hip froggers- sliders/elevated            Therapeutic Activity (12823)  Min:      Ladders      CloudWalkos      Dynamic Balance                            Therapeutic Exercise and NMR EXR  [x] (04666) Provided verbal/tactile cueing for activities related to strengthening, flexibility, endurance, ROM for improvements in LE, proximal hip, and core control with self care, mobility, lifting, ambulation. [x] (03772) Provided verbal/tactile cueing for activities related to improving balance, coordination, kinesthetic sense, posture, motor skill, proprioception  to assist with LE, proximal hip, and core control in self care, mobility, lifting, ambulation and eccentric single leg control.      NMR and Therapeutic Activities:    [x] (10091 or 40911) Provided verbal/tactile cueing for activities related to improving balance, coordination, kinesthetic sense, posture, motor skill, proprioception and motor activation to allow for proper function of core, proximal hip and LE with self care and ADLs and functional mobility.   [] (72090) Gait Re-education- Provided training and instruction to the patient for proper LE, core and proximal hip recruitment and positioning and eccentric body weight control with ambulation re-education including up and down stairs     Home Exercise Program:    [x] (28743) Reviewed/Progressed HEP activities related to strengthening, flexibility, endurance, ROM of core, proximal hip and LE for functional self-care, mobility, lifting and ambulation/stair navigation   [] (49706)Reviewed/Progressed HEP activities related to improving balance, coordination, kinesthetic sense, posture, motor skill, proprioception of core, proximal hip and LE for self care, mobility, lifting, and ambulation/stair navigation      Manual Treatments:  PROM / STM / Oscillations-Mobs:  G-I, II, III, IV (PA's, Inf., Post.)  [x] (54813) Provided manual therapy to mobilize LE, proximal hip and/or LS spine soft tissue/joints for the purpose of modulating pain, promoting relaxation,  increasing ROM, reducing/eliminating soft tissue swelling/inflammation/restriction, improving soft tissue extensibility and allowing for proper ROM for normal function with self care, mobility, lifting and ambulation. Modalities:     [] GAME READY (VASO)- for significant edema, swelling, pain control.      Charges:  Timed Code Treatment Minutes: 45   Total Treatment Minutes: 45      [] EVAL (LOW) 21438 (typically 20 minutes face-to-face)  [] EVAL (MOD) 41898 (typically 30 minutes face-to-face)  [] EVAL (HIGH) 01198 (typically 45 minutes face-to-face)  [] RE-EVAL     [x] SC(80881) x  2 KX   [] DRY NEEDLE 1 OR 2 MUSCLES  [] NMR (93165) x     [] DRY NEEDLE 3+ MUSCLES  [x] Manual (65104) x 1  KX    [] TA (85831) x     [] Mech Traction (89478)  [] ES(attended) (32735)     [] ES (un) (49145):   [] VASO (60598)  [] Other:    If BWC Please Indicate Time In/Out  CPT Code Time in Time out                                   OALS:  Patient stated goal: walk without pain  [x] Progressing: [] Met: [] Not Met: [] Adjusted    Therapist goals for Patient:   Short Term Goals: To be achieved in: 2 weeks  1. Independent in HEP and progression per patient tolerance, in order to prevent re-injury. [] Progressing: [x] Met: [] Not Met: [] Adjusted  2. Patient will have a decrease in pain to facilitate improvement in movement, function, and ADLs as indicated by Functional Deficits. [x] Progressing: [] Met: [] Not Met: [] Adjusted    Long Term Goals: To be achieved in: 4 weeks  1. Disability index score of 40% or less for the LEFS to assist with reaching prior level of function. [] Progressing: [] Met: [] Not Met: [] Adjusted  2. Patient will demonstrate increased AROM to 0-10 ankle DF to allow for proper joint functioning as indicated by patients Functional Deficits. [x] Progressing: [] Met: [] Not Met: [] Adjusted  3. Patient will demonstrate an increase in Strength to good proximal hip strength and control, within 5lb HHD in LE to allow for proper functional mobility as indicated by patients Functional Deficits. [x] Progressing: [] Met: [] Not Met: [] Adjusted  4. Patient will return to standing and walking functional activities without increased symptoms or restriction. [x] Progressing: [] Met: [] Not Met: [] Adjusted  5. Patient will report  that she is able to shag volleyballs for > 1 hr without increased symptoms or restriction. (patient specific functional goal)    [x] Progressing: [] Met: [] Not Met: [] Adjusted     ASSESSMENT:  Patient has been seen for 12 visits of physical therapy to address foot pain. Patient has been making good improvements in ROM, strength and NM control. Patient has been provided with new custom orthotics and shoes to assist in improved support of bilateral feet. Patient is still working on weaning into orthotic full time.  Patient will need to further progress ankle strength, ROM and NM control while in new orthotic to allow improved stability, rotational movements and balance during her normal ADLs and with shagging volleyball. Return to Play: (if applicable)   []  Stage 1: Intro to Strength   []  Stage 2: Return to Run and Strength   []  Stage 3: Return to Jump and Strength   []  Stage 4: Dynamic Strength and Agility   []  Stage 5: Sport Specific Training     []  Ready to Return to Play, Meets All Above Stages   []  Not Ready for Return to Sports   Comments:            Treatment/Activity Tolerance:  [x] Patient tolerated treatment well [] Patient limited by fatique  [] Patient limited by pain  [] Patient limited by other medical complications  [] Other:     Overall Progression Towards Functional goals/ Treatment Progress Update:  [x] Patient is progressing as expected towards functional goals listed. [] Progression is slowed due to complexities/Impairments listed. [] Progression has been slowed due to co-morbidities. [] Plan just implemented, too soon to assess goals progression <30days   [] Goals require adjustment due to lack of progress  [] Patient is not progressing as expected and requires additional follow up with physician  [] Other    Prognosis for POC: [x] Good [] Fair  [] Poor    Patient requires continued skilled intervention: [x] Yes  [] No        PLAN:   [x] Continue per plan of care [] Alter current plan (see comments)  [] Plan of care initiated [] Hold pending MD visit [] Discharge    Electronically signed by: Antelmo Burch PT    Note: If patient does not return for scheduled/recommended follow up visits, this note will serve as a discharge from care along with the most recent update on progress.

## 2021-11-15 ENCOUNTER — HOSPITAL ENCOUNTER (OUTPATIENT)
Dept: PHYSICAL THERAPY | Age: 71
Setting detail: THERAPIES SERIES
Discharge: HOME | End: 2021-11-15
Payer: MEDICARE

## 2021-11-15 PROCEDURE — 97110 THERAPEUTIC EXERCISES: CPT

## 2021-11-15 PROCEDURE — 97140 MANUAL THERAPY 1/> REGIONS: CPT

## 2021-11-15 NOTE — FLOWSHEET NOTE
[address] [place] [address]  Phone: [phone] Fax: [phone]        Physical Therapy Treatment Note/ Progress Report:     Date:  2021    Patient Name:  Vero Torre    :  1950  MRN: [id number]  Restrictions/Precautions:    Medical/Treatment Diagnosis Information:  Diagnosis: R foot pain, bilateral plantar fascitis  Treatment Diagnosis: bilateral plantar fascitis M72.2, R foot pain S61.596  Insurance/Certification information:  PT Insurance Information: Medicare//Aetna  Physician Information:  Referring Practitioner: Dr Vish Jeronimo of care signed (Y/N):     Date of Patient follow up with Physician:      Progress Report: [x]  Yes  []  No     Date Range for reporting period:  Beginnin2021  Ending:  -    Progress report due (10 Rx/or 30 days whichever is less):     Recertification due (POC duration/ or 90 days whichever is less): 2021     Visit # Insurance Allowable Auth Needed   13 Medicare/Aetna []Yes   [x]No     Latex Allergy:  [x]NO      []YES  Preferred Language for Healthcare:   [x]English       []other:  Functional Scale: LEFS 50% disability Date assessed:2021    Pain level: 3-4/10     SUBJECTIVE:  Patient reports that she is feeling some improvement in foot during most of the day. Has been able to increase the amount of hours she is in the orthotics. States that she didn't wear during [employer] over the weekend and hasn't worn during volleyball yet. Still feeling discomfort around little toe on R. Knee did hurt her for a day or so this past week; but rested and it is fine now.        OBJECTIVE:     Current footwear: Ramone  ROM LEFT RIGHT     Ankle PF 60 70   Ankle DF 8 5   Ankle In 15 50   Ankle Ev 15 20   Strength  LEFT RIGHT   Ankle DF  5 4+   Ankle PF  5 4+   Ankle Inv  4+ 4+   Ankle EV  4+ 4          LEFT RIGHT   Rearfoot position neutral neutral   Forefoot position neutral varus 1st Ray position neutral neutral   1st Ray mobility flexible flexible   Calcaneal position standing     Calcaneal position squatting     Total pronation       ROM LEFT RIGHT   DF knee bent [facility]   DF knee straight WFL WFL               Deep Squat: dysfunctional, painful  SLS- eyes open: not tested  SLS- eyes closed: not tested  Foot strike: flat foot position  Pronation: accelerated      RESTRICTIONS/PRECAUTIONS: R THR     Exercises/Interventions:     Therapeutic Ex (62578)   Min: 25 Sets/sec Reps Notes/CUES   Retro Stepper/BIKE      Gastroc stretch 0     Standing posterior tib activation 0  bilat   Standing toe and heel raise-with shoe and orthotic donned 0  double leg   Seated toe curls  0  bilat   Ankle 4 way with blue TB (latex free) 2 10    Standing SL corner rotation 1 15/each    Ambulation with shoes/orthotics      Quick rotation at corner 1 10/each    Calf stretch 30 3    SLS with alt march on airex pad 1 10    Leg Press Iso/Con/Ecc 0-      Cybex HS curl      TKE      Glute side walks      RDL      Slide Lunge      Slide HS eccentrics      Step ups/ecc step down      Swissball wall rolls- in SLS- hip drive      Quad hip ext/wall-ball rolls                  Manual Intervention (89220)  Min: 20 min      Knee mobs/PROM      Tib/Fem Mobs      Patella Mobs      Ankle mobs/STM 20                 NMR re-education (57817)  Min:   CUES NEEDED   Japanese/Biofeedback 10/10      BFR      G. Med activation      Hip Ext full ROM/ G.  Activation      Bosu Bal and Prop- G Med      Single leg stance/Balance/Prop      Bosu Retro G. Med act      Prone Hip froggers- sliders/elevated            Therapeutic Activity (87039)  Min:      Ladders      Plyos      Dynamic Balance                            Therapeutic Exercise and NMR EXR  [x] (18758) Provided verbal/tactile cueing for activities related to strengthening, flexibility, endurance, ROM for improvements in LE, proximal hip, and core control with self care, mobility, lifting, ambulation. [x] (72237) Provided verbal/tactile cueing for activities related to improving balance, coordination, kinesthetic sense, posture, motor skill, proprioception  to assist with LE, proximal hip, and core control in self care, mobility, lifting, ambulation and eccentric single leg control. NMR and Therapeutic Activities:    [x] (51101 or 17936) Provided verbal/tactile cueing for activities related to improving balance, coordination, kinesthetic sense, posture, motor skill, proprioception and motor activation to allow for proper function of core, proximal hip and LE with self care and ADLs and functional mobility.   [] (58008) Gait Re-education- Provided training and instruction to the patient for proper LE, core and proximal hip recruitment and positioning and eccentric body weight control with ambulation re-education including up and down stairs     Home Exercise Program:    [x] (96907) Reviewed/Progressed HEP activities related to strengthening, flexibility, endurance, ROM of core, proximal hip and LE for functional self-care, mobility, lifting and ambulation/stair navigation   [] (31440)Reviewed/Progressed HEP activities related to improving balance, coordination, kinesthetic sense, posture, motor skill, proprioception of core, proximal hip and LE for self care, mobility, lifting, and ambulation/stair navigation      Manual Treatments:  PROM / STM / Oscillations-Mobs:  G-I, II, III, IV (PA's, Inf., Post.)  [x] (02461) Provided manual therapy to mobilize LE, proximal hip and/or LS spine soft tissue/joints for the purpose of modulating pain, promoting relaxation,  increasing ROM, reducing/eliminating soft tissue swelling/inflammation/restriction, improving soft tissue extensibility and allowing for proper ROM for normal function with self care, mobility, lifting and ambulation. Modalities:     [] GAME READY (VASO)- for significant edema, swelling, pain control.      Charges:  Timed Code Treatment Minutes: 45   Total Treatment Minutes: 45      [] EVAL (LOW) 56922 (typically 20 minutes face-to-face)  [] EVAL (MOD) 73199 (typically 30 minutes face-to-face)  [] EVAL (HIGH) 39590 (typically 45 minutes face-to-face)  [] RE-EVAL     [x] IK(14285) x  2 KX   [] DRY NEEDLE 1 OR 2 MUSCLES  [] NMR (21149) x     [] DRY NEEDLE 3+ MUSCLES  [x] Manual (88111) x 1  KX    [] TA (97154) x     [] Mech Traction (05735)  [] ES(attended) (60044)     [] ES (un) (11204):   [] VASO (77269)  [] Other:    If BWC Please Indicate Time In/Out  CPT Code Time in Time out                                   OALS:  Patient stated goal: walk without pain  [x] Progressing: [] Met: [] Not Met: [] Adjusted    Therapist goals for Patient:   Short Term Goals: To be achieved in: 2 weeks  1. Independent in HEP and progression per patient tolerance, in order to prevent re-injury. [] Progressing: [x] Met: [] Not Met: [] Adjusted  2. Patient will have a decrease in pain to facilitate improvement in movement, function, and ADLs as indicated by Functional Deficits. [x] Progressing: [] Met: [] Not Met: [] Adjusted    Long Term Goals: To be achieved in: 4 weeks  1. Disability index score of 40% or less for the LEFS to assist with reaching prior level of function. [] Progressing: [] Met: [] Not Met: [] Adjusted  2. Patient will demonstrate increased AROM to 0-10 ankle DF to allow for proper joint functioning as indicated by patients Functional Deficits. [x] Progressing: [] Met: [] Not Met: [] Adjusted  3. Patient will demonstrate an increase in Strength to good proximal hip strength and control, within 5lb HHD in LE to allow for proper functional mobility as indicated by patients Functional Deficits. [x] Progressing: [] Met: [] Not Met: [] Adjusted  4. Patient will return to standing and walking functional activities without increased symptoms or restriction. [x] Progressing: [] Met: [] Not Met: [] Adjusted  5.  Patient will report  that she is able to shag volleyballs for > 1 hr without increased symptoms or restriction. (patient specific functional goal)    [x] Progressing: [] Met: [] Not Met: [] Adjusted     ASSESSMENT:  Patient with quite increased restriction in posterior tibialis today. Very tight and painful with good improvement in discomfort with soft tissue mobilization. Cues for form with ankle band strengthening to decrease irritation. Patient did well with all strengthening and stretching for ankle. Encouraged patient to wear orthotics for all ADLs and 50% of time with volleyball this week and will work to 100% of the time next week. Needs further improved stability, rotational movements and balance during her normal ADLs and with shagging volleyball. Return to Play: (if applicable)   []  Stage 1: Intro to Strength   []  Stage 2: Return to Run and Strength   []  Stage 3: Return to Jump and Strength   []  Stage 4: Dynamic Strength and Agility   []  Stage 5: Sport Specific Training     []  Ready to Return to Play, Meets All Above Stages   []  Not Ready for Return to Sports   Comments:            Treatment/Activity Tolerance:  [x] Patient tolerated treatment well [] Patient limited by fatique  [] Patient limited by pain  [] Patient limited by other medical complications  [] Other:     Overall Progression Towards Functional goals/ Treatment Progress Update:  [x] Patient is progressing as expected towards functional goals listed. [] Progression is slowed due to complexities/Impairments listed. [] Progression has been slowed due to co-morbidities.   [] Plan just implemented, too soon to assess goals progression <30days   [] Goals require adjustment due to lack of progress  [] Patient is not progressing as expected and requires additional follow up with physician  [] Other    Prognosis for POC: [x] Good [] Fair  [] Poor    Patient requires continued skilled intervention: [x] Yes  [] No        PLAN:   [x] Continue per plan of care []

## 2021-11-22 ENCOUNTER — HOSPITAL ENCOUNTER (OUTPATIENT)
Dept: PHYSICAL THERAPY | Age: 71
Setting detail: THERAPIES SERIES
Discharge: HOME | End: 2021-11-22
Payer: MEDICARE

## 2021-11-22 PROCEDURE — 97110 THERAPEUTIC EXERCISES: CPT

## 2021-11-22 PROCEDURE — 97140 MANUAL THERAPY 1/> REGIONS: CPT

## 2021-11-22 NOTE — FLOWSHEET NOTE
[place] [address] [address]  Phone: [phone] Fax: [phone]        Physical Therapy Treatment Note/ Progress Report:     Date:  2021    Patient Name:  Shay Hardy    :  1950  MRN: [id number]  Restrictions/Precautions:    Medical/Treatment Diagnosis Information:  Diagnosis: R foot pain, bilateral plantar fascitis  Treatment Diagnosis: bilateral plantar fascitis M72.2, R foot pain S91.918  Insurance/Certification information:  PT Insurance Information: Medicare//Aetna  Physician Information:  Referring Practitioner: Dr Moises Jenkins of care signed (Y/N):     Date of Patient follow up with Physician:      Progress Report: [x]  Yes  []  No     Date Range for reporting period:  Beginnin2021  Ending:  -    Progress report due (10 Rx/or 30 days whichever is less):     Recertification due (POC duration/ or 90 days whichever is less): 2021     Visit # Insurance Allowable Auth Needed   14 Medicare/Aetna []Yes   [x]No     Latex Allergy:  [x]NO      []YES  Preferred Language for Healthcare:   [x]English       []other:  Functional Scale: LEFS 50% disability Date assessed:2021    Pain level: 3-4/10     SUBJECTIVE:  Patient reports that she has been wearing orthotics at all times. Notes she tried to put shoe on the other day and wiggled foot and had spasm in her calf. States that the following day she had spasm as well into calf, but wasn't doing anything. Not sure why she was having this. A bit sore along top of foot, arch and little toe today.         OBJECTIVE:     Current footwear: Ramone  ROM LEFT RIGHT     Ankle PF 60 70   Ankle DF 8 5   Ankle In 15 50   Ankle Ev 15 20   Strength  LEFT RIGHT   Ankle DF  5 4+   Ankle PF  5 4+   Ankle Inv  4+ 4+   Ankle EV  4+ 4          LEFT RIGHT   Rearfoot position neutral neutral   Forefoot position neutral varus   1st Ray position neutral neutral   1st Ray mobility flexible flexible   Calcaneal position standing     Calcaneal position squatting     Total pronation       ROM LEFT RIGHT   DF knee bent [facility]   DF knee straight WFL WFL               Deep Squat: dysfunctional, painful  SLS- eyes open: not tested  SLS- eyes closed: not tested  Foot strike: flat foot position  Pronation: accelerated      RESTRICTIONS/PRECAUTIONS: R THR     Exercises/Interventions:     Therapeutic Ex (58000)   Min: 15 Sets/sec Reps Notes/CUES   Retro Stepper/BIKE      Gastroc stretch 0     Standing posterior tib activation 0  bilat   Standing toe and heel raise-with shoe and orthotic donned 0  double leg   Seated toe curls  0  bilat   Ankle 4 way with blue TB (latex free) 2 10    Standing SL corner rotation 1 15/each    Ambulation with shoes/orthotics      Quick rotation at corner 0     Calf stretch 30 3 Instructed at wall for home   SLS with alt march on airex pad 0     Leg Press Iso/Con/Ecc 0-      Cybex HS curl      TKE      Glute side walks      RDL      Slide Lunge      Slide HS eccentrics      Step ups/ecc step down      Swissball wall rolls- in SLS- hip drive      Quad hip ext/wall-ball rolls                  Manual Intervention (80997)  Min: 25 min      Knee mobs/PROM      Tib/Fem Mobs      Patella Mobs      Ankle mobs  10 TC, mid foot and rearfoot   STM gastroc, posterior tib  15          NMR re-education (72320)  Min:   CUES NEEDED   [place]/Biofeedback 10/10      BFR      G. Med activation      Hip Ext full ROM/ G.  Activation      Bosu Bal and Prop- G Med      Single leg stance/Balance/Prop      Bosu Retro G. Med act      Prone Hip froggers- sliders/elevated            Therapeutic Activity (95470)  Min:      Ladders      Plyos      Dynamic Balance                            Therapeutic Exercise and NMR EXR  [x] (58426) Provided verbal/tactile cueing for activities related to strengthening, flexibility, endurance, ROM for improvements in LE, proximal hip, and core control with self care, mobility, lifting, ambulation. [x] (30780) Provided verbal/tactile cueing for activities related to improving balance, coordination, kinesthetic sense, posture, motor skill, proprioception  to assist with LE, proximal hip, and core control in self care, mobility, lifting, ambulation and eccentric single leg control. NMR and Therapeutic Activities:    [x] (75196 or 03613) Provided verbal/tactile cueing for activities related to improving balance, coordination, kinesthetic sense, posture, motor skill, proprioception and motor activation to allow for proper function of core, proximal hip and LE with self care and ADLs and functional mobility.   [] (52633) Gait Re-education- Provided training and instruction to the patient for proper LE, core and proximal hip recruitment and positioning and eccentric body weight control with ambulation re-education including up and down stairs     Home Exercise Program:    [x] (29585) Reviewed/Progressed HEP activities related to strengthening, flexibility, endurance, ROM of core, proximal hip and LE for functional self-care, mobility, lifting and ambulation/stair navigation   [] (50430)Reviewed/Progressed HEP activities related to improving balance, coordination, kinesthetic sense, posture, motor skill, proprioception of core, proximal hip and LE for self care, mobility, lifting, and ambulation/stair navigation      Manual Treatments:  PROM / STM / Oscillations-Mobs:  G-I, II, III, IV (PA's, Inf., Post.)  [x] (24391) Provided manual therapy to mobilize LE, proximal hip and/or LS spine soft tissue/joints for the purpose of modulating pain, promoting relaxation,  increasing ROM, reducing/eliminating soft tissue swelling/inflammation/restriction, improving soft tissue extensibility and allowing for proper ROM for normal function with self care, mobility, lifting and ambulation. Modalities:     [] GAME READY (VASO)- for significant edema, swelling, pain control. Charges:  Timed Code Treatment Minutes: 40   Total Treatment Minutes: 41      [] EVAL (LOW) 81470 (typically 20 minutes face-to-face)  [] EVAL (MOD) 25086 (typically 30 minutes face-to-face)  [] EVAL (HIGH) 62184 (typically 45 minutes face-to-face)  [] RE-EVAL     [x] AF(71609) x  1 KX   [] DRY NEEDLE 1 OR 2 MUSCLES  [] NMR (55545) x     [] DRY NEEDLE 3+ MUSCLES  [x] Manual (43870) x 2  KX    [] TA (16758) x     [] Mech Traction (57867)  [] ES(attended) (20239)     [] ES (un) (71592):   [] VASO (91871)  [] Other:    If BWC Please Indicate Time In/Out  CPT Code Time in Time out                                   OALS:  Patient stated goal: walk without pain  [x] Progressing: [] Met: [] Not Met: [] Adjusted    Therapist goals for Patient:   Short Term Goals: To be achieved in: 2 weeks  1. Independent in HEP and progression per patient tolerance, in order to prevent re-injury. [] Progressing: [x] Met: [] Not Met: [] Adjusted  2. Patient will have a decrease in pain to facilitate improvement in movement, function, and ADLs as indicated by Functional Deficits. [x] Progressing: [] Met: [] Not Met: [] Adjusted    Long Term Goals: To be achieved in: 4 weeks  1. Disability index score of 40% or less for the LEFS to assist with reaching prior level of function. [] Progressing: [] Met: [] Not Met: [] Adjusted  2. Patient will demonstrate increased AROM to 0-10 ankle DF to allow for proper joint functioning as indicated by patients Functional Deficits. [x] Progressing: [] Met: [] Not Met: [] Adjusted  3. Patient will demonstrate an increase in Strength to good proximal hip strength and control, within 5lb HHD in LE to allow for proper functional mobility as indicated by patients Functional Deficits. [x] Progressing: [] Met: [] Not Met: [] Adjusted  4. Patient will return to standing and walking functional activities without increased symptoms or restriction. [x] Progressing: [] Met: [] Not Met: [] Adjusted  5. Patient will report  that she is able to shag volleyballs for > 1 hr without increased symptoms or restriction. (patient specific functional goal)    [x] Progressing: [] Met: [] Not Met: [] Adjusted     ASSESSMENT:  Patient with quite increased restriction in posterior tibialis today. Very tight and painful with good improvement in discomfort with soft tissue mobilization. Cues for form with ankle band strengthening to decrease irritation. Patient did well with all strengthening and stretching for ankle. Adjusted stretch for gastroc/soleus and post tib for home. Patient verbalized and demonstrating understanding. Needs further improved stability, rotational movements and balance during her normal ADLs and with shagging volleyball. Return to Play: (if applicable)   []  Stage 1: Intro to Strength   []  Stage 2: Return to Run and Strength   []  Stage 3: Return to Jump and Strength   []  Stage 4: Dynamic Strength and Agility   []  Stage 5: Sport Specific Training     []  Ready to Return to Play, Meets All Above Stages   []  Not Ready for Return to Sports   Comments:            Treatment/Activity Tolerance:  [x] Patient tolerated treatment well [] Patient limited by fatique  [] Patient limited by pain  [] Patient limited by other medical complications  [] Other:     Overall Progression Towards Functional goals/ Treatment Progress Update:  [x] Patient is progressing as expected towards functional goals listed. [] Progression is slowed due to complexities/Impairments listed. [] Progression has been slowed due to co-morbidities.   [] Plan just implemented, too soon to assess goals progression <30days   [] Goals require adjustment due to lack of progress  [] Patient is not progressing as expected and requires additional follow up with physician  [] Other    Prognosis for POC: [x] Good [] Fair  [] Poor    Patient requires continued skilled intervention: [x] Yes  [] No        PLAN:   [x] Continue per plan of care [] Alter current plan (see comments)  [] Plan of care initiated [] Hold pending MD visit [] Discharge    Electronically signed by: Micheline Morrissey PT    Note: If patient does not return for scheduled/recommended follow up visits, this note will serve as a discharge from care along with the most recent update on progress.

## 2021-11-29 ENCOUNTER — HOSPITAL ENCOUNTER (OUTPATIENT)
Dept: PHYSICAL THERAPY | Age: 71
Setting detail: THERAPIES SERIES
Discharge: HOME | End: 2021-11-29
Payer: MEDICARE

## 2021-11-29 PROCEDURE — 97110 THERAPEUTIC EXERCISES: CPT

## 2021-11-29 PROCEDURE — 97140 MANUAL THERAPY 1/> REGIONS: CPT

## 2021-11-29 NOTE — FLOWSHEET NOTE
[place] [address] [address]  Phone: [phone] Fax: [phone]        Physical Therapy Treatment Note/ Progress Report:     Date:  2021    Patient Name:  Tiesha Payan    :  1950  MRN: [id number]  Restrictions/Precautions:    Medical/Treatment Diagnosis Information:  Diagnosis: R foot pain, bilateral plantar fascitis  Treatment Diagnosis: bilateral plantar fascitis M72.2, R foot pain V95.960  Insurance/Certification information:  PT Insurance Information: Medicare//Aetna  Physician Information:  Referring Practitioner: Dr Ivan Carr of care signed (Y/N):     Date of Patient follow up with Physician:      Progress Report: [x]  Yes  []  No     Date Range for reporting period:  Beginnin2021  Ending:  -    Progress report due (10 Rx/or 30 days whichever is less): 5800    Recertification due (POC duration/ or 90 days whichever is less): 2021     Visit # Insurance Allowable Auth Needed   15 Medicare/Aetna []Yes   [x]No     Latex Allergy:  [x]NO      []YES  Preferred Language for Healthcare:   [x]English       []other:  Functional Scale: LEFS 50% disability Date assessed:2021    Pain level: 3-4/10     SUBJECTIVE:  Patient reports that she has been wearing orthotics and new shoes at all times. Reports that her feet have been feeling much better. Still gets sore at end of day; but feeling better overall. Felt like is she needed to run then she could.           OBJECTIVE:     Current footwear: Ramone  ROM LEFT RIGHT     Ankle PF 60 70   Ankle DF 8 5   Ankle In 15 50   Ankle Ev 15 20   Strength  LEFT RIGHT   Ankle DF  5 4+   Ankle PF  5 4+   Ankle Inv  4+ 4+   Ankle EV  4+ 4          LEFT RIGHT   Rearfoot position neutral neutral   Forefoot position neutral varus   1st Ray position neutral neutral   1st Ray mobility flexible flexible   Calcaneal position standing     Calcaneal position squatting Total pronation       ROM LEFT RIGHT   [facility]   DF knee straight [facility] WFL               Deep Squat: dysfunctional, painful  SLS- eyes open: not tested  SLS- eyes closed: not tested  Foot strike: flat foot position  Pronation: accelerated      RESTRICTIONS/PRECAUTIONS: R THR     Exercises/Interventions:     Therapeutic Ex (35655)   Min: 20 Sets/sec Reps Notes/CUES   Retro Stepper/BIKE      Gastroc stretch 0     Standing posterior tib activation 0  bilat   Standing toe and heel raise-with shoe and orthotic donned 2 10 double leg   Seated toe curls  0  bilat   Ankle 4 way with blue TB (latex free) 2 10    Standing SL corner rotation 1 15/each    Ambulation with shoes/orthotics      Quick rotation at corner 0     Calf stretch 30 3 Instructed at wall for home and on step   SLS with alt march on airex pad 0     Leg Press Iso/Con/Ecc 0-      Cybex HS curl      TKE      Glute side walks      RDL      BOSU Lunge 10sec 5    Slide HS eccentrics      Step ups/ecc step down      Swissball wall rolls- in SLS- hip drive      Quad hip ext/wall-ball rolls                  Manual Intervention (32774)  Min: 25 min      Knee mobs/PROM      Tib/Fem Mobs      Patella Mobs      Ankle mobs  10 TC, mid foot and rearfoot   STM gastroc, posterior tib  15          NMR re-education (69819)  Min:   CUES NEEDED   [place]/Biofeedback 10/10      BFR      G. Med activation      Hip Ext full ROM/ G. Activation      Bosu Bal and Prop- G Med      Single leg stance/Balance/Prop      Bosu Retro G. Med act      Prone Hip froggers- sliders/elevated            Therapeutic Activity (94143)  Min:      Ladders      Plyos      Dynamic Balance                            Therapeutic Exercise and NMR EXR  [x] (70846) Provided verbal/tactile cueing for activities related to strengthening, flexibility, endurance, ROM for improvements in LE, proximal hip, and core control with self care, mobility, lifting, ambulation.   [x] (93882) Provided verbal/tactile cueing for activities related to improving balance, coordination, kinesthetic sense, posture, motor skill, proprioception  to assist with LE, proximal hip, and core control in self care, mobility, lifting, ambulation and eccentric single leg control. NMR and Therapeutic Activities:    [x] (79714 or 86828) Provided verbal/tactile cueing for activities related to improving balance, coordination, kinesthetic sense, posture, motor skill, proprioception and motor activation to allow for proper function of core, proximal hip and LE with self care and ADLs and functional mobility.   [] (43205) Gait Re-education- Provided training and instruction to the patient for proper LE, core and proximal hip recruitment and positioning and eccentric body weight control with ambulation re-education including up and down stairs     Home Exercise Program:    [x] (48269) Reviewed/Progressed HEP activities related to strengthening, flexibility, endurance, ROM of core, proximal hip and LE for functional self-care, mobility, lifting and ambulation/stair navigation   [] (11877)Reviewed/Progressed HEP activities related to improving balance, coordination, kinesthetic sense, posture, motor skill, proprioception of core, proximal hip and LE for self care, mobility, lifting, and ambulation/stair navigation      Manual Treatments:  PROM / STM / Oscillations-Mobs:  G-I, II, III, IV (PA's, Inf., Post.)  [x] (81839) Provided manual therapy to mobilize LE, proximal hip and/or LS spine soft tissue/joints for the purpose of modulating pain, promoting relaxation,  increasing ROM, reducing/eliminating soft tissue swelling/inflammation/restriction, improving soft tissue extensibility and allowing for proper ROM for normal function with self care, mobility, lifting and ambulation. Modalities:     [] GAME READY (VASO)- for significant edema, swelling, pain control.      Charges:  Timed Code Treatment Minutes: 45   Total Treatment Minutes: 45      [] EVAL (LOW) 24234 (typically 20 minutes face-to-face)  [] EVAL (MOD) 08342 (typically 30 minutes face-to-face)  [] EVAL (HIGH) 23449 (typically 45 minutes face-to-face)  [] RE-EVAL     [x] FG(19202) x  1 KX   [] DRY NEEDLE 1 OR 2 MUSCLES  [] NMR (61136) x     [] DRY NEEDLE 3+ MUSCLES  [x] Manual (90604) x 2  KX    [] TA (44655) x     [] Mech Traction (86329)  [] ES(attended) (54476)     [] ES (un) (69987):   [] VASO (09744)  [] Other:    If BWC Please Indicate Time In/Out  CPT Code Time in Time out                                   OALS:  Patient stated goal: walk without pain  [x] Progressing: [] Met: [] Not Met: [] Adjusted    Therapist goals for Patient:   Short Term Goals: To be achieved in: 2 weeks  1. Independent in HEP and progression per patient tolerance, in order to prevent re-injury. [] Progressing: [x] Met: [] Not Met: [] Adjusted  2. Patient will have a decrease in pain to facilitate improvement in movement, function, and ADLs as indicated by Functional Deficits. [x] Progressing: [] Met: [] Not Met: [] Adjusted    Long Term Goals: To be achieved in: 4 weeks  1. Disability index score of 40% or less for the LEFS to assist with reaching prior level of function. [] Progressing: [] Met: [] Not Met: [] Adjusted  2. Patient will demonstrate increased AROM to 0-10 ankle DF to allow for proper joint functioning as indicated by patients Functional Deficits. [x] Progressing: [] Met: [] Not Met: [] Adjusted  3. Patient will demonstrate an increase in Strength to good proximal hip strength and control, within 5lb HHD in LE to allow for proper functional mobility as indicated by patients Functional Deficits. [x] Progressing: [] Met: [] Not Met: [] Adjusted  4. Patient will return to standing and walking functional activities without increased symptoms or restriction. [x] Progressing: [] Met: [] Not Met: [] Adjusted  5.  Patient will report  that she is able to shag volleyballs for > 1 hr without increased symptoms or restriction. (patient specific functional goal)    [x] Progressing: [] Met: [] Not Met: [] Adjusted     ASSESSMENT:  Patient with less restriction noted in posterior tibialis today compared to last session. Cues for form with ankle band strengthening to decrease irritation. Patient did well with all strengthening and stretching for ankle. Adjusted stretch for gastroc/soleus and post tib for home. Patient verbalized and demonstrating understanding. Needs further improved stability, rotational movements and balance during her normal ADLs and with shagging volleyball. Return to Play: (if applicable)   []  Stage 1: Intro to Strength   []  Stage 2: Return to Run and Strength   []  Stage 3: Return to Jump and Strength   []  Stage 4: Dynamic Strength and Agility   []  Stage 5: Sport Specific Training     []  Ready to Return to Play, Meets All Above Stages   []  Not Ready for Return to Sports   Comments:            Treatment/Activity Tolerance:  [x] Patient tolerated treatment well [] Patient limited by fatique  [] Patient limited by pain  [] Patient limited by other medical complications  [] Other:     Overall Progression Towards Functional goals/ Treatment Progress Update:  [x] Patient is progressing as expected towards functional goals listed. [] Progression is slowed due to complexities/Impairments listed. [] Progression has been slowed due to co-morbidities.   [] Plan just implemented, too soon to assess goals progression <30days   [] Goals require adjustment due to lack of progress  [] Patient is not progressing as expected and requires additional follow up with physician  [] Other    Prognosis for POC: [x] Good [] Fair  [] Poor    Patient requires continued skilled intervention: [x] Yes  [] No        PLAN:   [x] Continue per plan of care [] Alter current plan (see comments)  [] Plan of care initiated [] Hold pending MD visit [] Discharge    Electronically signed by: Patricia Boyce Erlin, PT    Note: If patient does not return for scheduled/recommended follow up visits, this note will serve as a discharge from care along with the most recent update on progress.

## 2021-12-06 ENCOUNTER — HOSPITAL ENCOUNTER (OUTPATIENT)
Dept: PHYSICAL THERAPY | Age: 71
Setting detail: THERAPIES SERIES
Discharge: HOME | End: 2021-12-06
Payer: MEDICARE

## 2021-12-06 PROCEDURE — 97140 MANUAL THERAPY 1/> REGIONS: CPT

## 2021-12-06 NOTE — FLOWSHEET NOTE
[address] [place] [address] [place] [address]  Phone: [phone] Fax: [phone]        Physical Therapy Treatment Note/ Progress Report:     Date:  2021    Patient Name:  Daylin Ritter    :  1950  MRN: [id number]  Restrictions/Precautions:    Medical/Treatment Diagnosis Information:  Diagnosis: R foot pain, bilateral plantar fascitis  Treatment Diagnosis: bilateral plantar fascitis M72.2, R foot pain S61.097  Insurance/Certification information:  PT Insurance Information: Medicare//Aetna  Physician Information:  Referring Practitioner: Dr Byron Peña of care signed (Y/N):     Date of Patient follow up with Physician:      Progress Report: [x]  Yes  []  No     Date Range for reporting period:  Beginnin2021  Ending:  -    Progress report due (10 Rx/or 30 days whichever is less):     Recertification due (POC duration/ or 90 days whichever is less): 2021     Visit # Insurance Allowable Auth Needed   16 Medicare/Aetna []Yes   [x]No     Latex Allergy:  [x]NO      []YES  Preferred Language for Healthcare:   [x]English       []other:  Functional Scale: LEFS 50% disability Date assessed:2021    Pain level: 3-4/10     SUBJECTIVE:  Patient reports that she has been wearing orthotics and new shoes at all times. Has been stretching her calves and having increased soreness along lateral R foot. Has been very tender along small toe. Has been doing her exercises, but not sure why her foot is hurting her so much today.          OBJECTIVE:     Current footwear: Ramone  ROM LEFT RIGHT     Ankle PF 60 70   Ankle DF 8 5   Ankle In 15 50   Ankle Ev 15 20   Strength  LEFT RIGHT   Ankle DF  5 4+   Ankle PF  5 4+   Ankle Inv  4+ 4+   Ankle EV  4+ 4          LEFT RIGHT   Rearfoot position neutral neutral   Forefoot position neutral varus   1st Ray position neutral neutral   1st Ray mobility flexible flexible   Calcaneal position standing     Calcaneal position squatting     Total pronation       ROM LEFT RIGHT   DF knee bent [facility] [place]   DF knee straight WFL WFL               Deep Squat: dysfunctional, painful  SLS- eyes open: not tested  SLS- eyes closed: not tested  Foot strike: flat foot position  Pronation: accelerated      RESTRICTIONS/PRECAUTIONS: R THR     Exercises/Interventions:     Therapeutic Ex (28962)   Min: 10 Sets/sec Reps Notes/CUES   Retro Stepper/BIKE      Gastroc stretch 30 3 Cues for form and intensity/duration   Standing posterior tib activation 0  bilat   Standing toe and heel raise-with shoe and orthotic donned 0  double leg   Seated toe curls  0  bilat   Ankle 4 way with blue TB (latex free) 2 10    Standing SL corner rotation 0     Ambulation with shoes/orthotics      Quick rotation at corner 0     SLS with alt march on airex pad 0     Leg Press Iso/Con/Ecc 0-      Cybex HS curl      TKE      Glute side walks      RDL      BOSU Lunge 10sec 5    Slide HS eccentrics      Step ups/ecc step down      Swissball wall rolls- in SLS- hip drive      Quad hip ext/wall-ball rolls                  Manual Intervention (60557)  Min: 25 min      Knee mobs/PROM      Tib/Fem Mobs      Patella Mobs      Ankle mobs  10 TC, mid foot and rearfoot   STM gastroc, posterior tib  15          NMR re-education (93677)  Min:   CUES NEEDED   Kyrgyz/Biofeedback 10/10      BFR      G. Med activation      Hip Ext full ROM/ G. Activation      Bosu Bal and Prop- G Med      Single leg stance/Balance/Prop      Bosu Retro G. Med act      Prone Hip froggers- sliders/elevated            Therapeutic Activity (05687)  Min:      Ladders      Plyos      Dynamic Balance                            Therapeutic Exercise and NMR EXR  [x] (20981) Provided verbal/tactile cueing for activities related to strengthening, flexibility, endurance, ROM for improvements in LE, proximal hip, and core control with self care, mobility, lifting, ambulation.   [x] (26906) Provided verbal/tactile cueing for activities related to improving balance, coordination, kinesthetic sense, posture, motor skill, proprioception  to assist with LE, proximal hip, and core control in self care, mobility, lifting, ambulation and eccentric single leg control. NMR and Therapeutic Activities:    [x] (13604 or 71392) Provided verbal/tactile cueing for activities related to improving balance, coordination, kinesthetic sense, posture, motor skill, proprioception and motor activation to allow for proper function of core, proximal hip and LE with self care and ADLs and functional mobility.   [] (45069) Gait Re-education- Provided training and instruction to the patient for proper LE, core and proximal hip recruitment and positioning and eccentric body weight control with ambulation re-education including up and down stairs     Home Exercise Program:    [x] (08697) Reviewed/Progressed HEP activities related to strengthening, flexibility, endurance, ROM of core, proximal hip and LE for functional self-care, mobility, lifting and ambulation/stair navigation   [] (34615)Reviewed/Progressed HEP activities related to improving balance, coordination, kinesthetic sense, posture, motor skill, proprioception of core, proximal hip and LE for self care, mobility, lifting, and ambulation/stair navigation      Manual Treatments:  PROM / STM / Oscillations-Mobs:  G-I, II, III, IV (PA's, Inf., Post.)  [x] (12622) Provided manual therapy to mobilize LE, proximal hip and/or LS spine soft tissue/joints for the purpose of modulating pain, promoting relaxation,  increasing ROM, reducing/eliminating soft tissue swelling/inflammation/restriction, improving soft tissue extensibility and allowing for proper ROM for normal function with self care, mobility, lifting and ambulation. Modalities:     [] GAME READY (VASO)- for significant edema, swelling, pain control.      Charges:  Timed Code Treatment Minutes: 35   Total Treatment Minutes: 35      [] EVAL (LOW) 48316 (typically 20 minutes face-to-face)  [] EVAL (MOD) 61317 (typically 30 minutes face-to-face)  [] EVAL (HIGH) 50618 (typically 45 minutes face-to-face)  [] RE-EVAL     [x] MOOKIE(69995) x     [] DRY NEEDLE 1 OR 2 MUSCLES  [] NMR (05306) x     [] DRY NEEDLE 3+ MUSCLES  [x] Manual (46529) x 2  KX    [] TA (40430) x     [] Mech Traction (51061)  [] ES(attended) (97422)     [] ES (un) (10026):   [] VASO (51004)  [] Other:    If BWC Please Indicate Time In/Out  CPT Code Time in Time out                                   OALS:  Patient stated goal: walk without pain  [x] Progressing: [] Met: [] Not Met: [] Adjusted    Therapist goals for Patient:   Short Term Goals: To be achieved in: 2 weeks  1. Independent in HEP and progression per patient tolerance, in order to prevent re-injury. [] Progressing: [x] Met: [] Not Met: [] Adjusted  2. Patient will have a decrease in pain to facilitate improvement in movement, function, and ADLs as indicated by Functional Deficits. [x] Progressing: [] Met: [] Not Met: [] Adjusted    Long Term Goals: To be achieved in: 4 weeks  1. Disability index score of 40% or less for the LEFS to assist with reaching prior level of function. [] Progressing: [] Met: [] Not Met: [] Adjusted  2. Patient will demonstrate increased AROM to 0-10 ankle DF to allow for proper joint functioning as indicated by patients Functional Deficits. [x] Progressing: [] Met: [] Not Met: [] Adjusted  3. Patient will demonstrate an increase in Strength to good proximal hip strength and control, within 5lb HHD in LE to allow for proper functional mobility as indicated by patients Functional Deficits. [x] Progressing: [] Met: [] Not Met: [] Adjusted  4. Patient will return to standing and walking functional activities without increased symptoms or restriction. [x] Progressing: [] Met: [] Not Met: [] Adjusted  5.  Patient will report  that she is able to shag volleyballs for > 1 hr without increased symptoms or restriction. (patient specific functional goal)    [x] Progressing: [] Met: [] Not Met: [] Adjusted     ASSESSMENT:  Patient with continued reduction in restriction noted in posterior tibialis. With increased soreness in lateral foot, 5th met and 5th digit. Patient tight and sore. Good improvement in discomfort with soft tissue mobilization. Address patient form and frequency with gastroc stretching. Patient was doing too frequently and likely contributing to increased soreness. Needs further improved stability, rotational movements and balance during her normal ADLs and with shagging volleyball. Return to Play: (if applicable)   []  Stage 1: Intro to Strength   []  Stage 2: Return to Run and Strength   []  Stage 3: Return to Jump and Strength   []  Stage 4: Dynamic Strength and Agility   []  Stage 5: Sport Specific Training     []  Ready to Return to Play, Meets All Above Stages   []  Not Ready for Return to Sports   Comments:            Treatment/Activity Tolerance:  [x] Patient tolerated treatment well [] Patient limited by fatique  [] Patient limited by pain  [] Patient limited by other medical complications  [] Other:     Overall Progression Towards Functional goals/ Treatment Progress Update:  [x] Patient is progressing as expected towards functional goals listed. [] Progression is slowed due to complexities/Impairments listed. [] Progression has been slowed due to co-morbidities.   [] Plan just implemented, too soon to assess goals progression <30days   [] Goals require adjustment due to lack of progress  [] Patient is not progressing as expected and requires additional follow up with physician  [] Other    Prognosis for POC: [x] Good [] Fair  [] Poor    Patient requires continued skilled intervention: [x] Yes  [] No        PLAN:   [x] Continue per plan of care [] Alter current plan (see comments)  [] Plan of care initiated [] Hold pending MD visit [] Discharge    Electronically signed by: Jennifer Alvarez PT    Note: If patient does not return for scheduled/recommended follow up visits, this note will serve as a discharge from care along with the most recent update on progress.

## 2021-12-13 ENCOUNTER — HOSPITAL ENCOUNTER (OUTPATIENT)
Dept: PHYSICAL THERAPY | Age: 71
Setting detail: THERAPIES SERIES
Discharge: HOME | End: 2021-12-13
Payer: MEDICARE

## 2021-12-13 PROCEDURE — 97140 MANUAL THERAPY 1/> REGIONS: CPT

## 2021-12-13 PROCEDURE — 97110 THERAPEUTIC EXERCISES: CPT

## 2021-12-13 NOTE — FLOWSHEET NOTE
[address] [place] [address] [place] [address]  Phone: [phone] Fax: [phone]        Physical Therapy Treatment Note/ Progress Report:     Date:  2021    Patient Name:  Naila Weinberg    :  1950  MRN: [id number]  Restrictions/Precautions:    Medical/Treatment Diagnosis Information:  Diagnosis: R foot pain, bilateral plantar fascitis  Treatment Diagnosis: bilateral plantar fascitis M72.2, R foot pain N38.943  Insurance/Certification information:  PT Insurance Information: Medicare//Aetna  Physician Information:  Referring Practitioner: Dr Dionisio Bob of care signed (Y/N):     Date of Patient follow up with Physician:      Progress Report: [x]  Yes  []  No     Date Range for reporting period:  Beginnin2021  Ending:  -    Progress report due (10 Rx/or 30 days whichever is less):     Recertification due (POC duration/ or 90 days whichever is less): 2021     Visit # Insurance Allowable Auth Needed   17 Medicare/Aetna []Yes   [x]No     Latex Allergy:  [x]NO      []YES  Preferred Language for Healthcare:   [x]English       []other:  Functional Scale: LEFS 50% disability Date assessed:2021    Pain level: 3-4/10     SUBJECTIVE:  Patient reports that she has been wearing orthotics and new shoes at all times. Notes that her R little toe has been hurting with the orthotics. States that she thinks its because the orthotic is sliding a little in the shoe. When it is all the way to the front of the shoe it feels better in this area.            OBJECTIVE:     Current footwear: Ramone  ROM LEFT RIGHT     Ankle PF 60 70   Ankle DF 8 5   Ankle In 15 50   Ankle Ev 15 20   Strength  LEFT RIGHT   Ankle DF  5 4+   Ankle PF  5 4+   Ankle Inv  4+ 4+   Ankle EV  4+ 4          LEFT RIGHT   Rearfoot position neutral neutral   Forefoot position neutral varus   1st Ray position neutral neutral   1st Ray mobility flexible flexible Calcaneal position standing     Calcaneal position squatting     Total pronation       ROM LEFT RIGHT   DF knee bent [facility] [place] [facility]   DF knee straight WFL WFL               Deep Squat: dysfunctional, painful  SLS- eyes open: not tested  SLS- eyes closed: not tested  Foot strike: flat foot position  Pronation: accelerated      RESTRICTIONS/PRECAUTIONS: R THR     Exercises/Interventions:     Therapeutic Ex (89681)   Min: 20 Sets/sec Reps Notes/CUES   Retro Stepper/BIKE      Gastroc stretch 30 3 Cues for form and intensity/duration   Standing posterior tib activation 0  bilat   Standing toe and heel raise-with shoe and orthotic donned 0  double leg   Seated toe curls  0  bilat   Ankle 4 way with blue TB (latex free) 2 10    Standing SL corner rotation 1 15 bilat   Double leg HR with eccentric lowering 1 15 bilat   Ambulation with shoes/orthotics      Quick rotation at corner 0     SLS with alt march on airex pad 0     Leg Press Iso/Con/Ecc 0-      Cybex HS curl      TKE      Glute side walks      RDL      BOSU Lunge 10sec 5    Slide HS eccentrics      Step ups/ecc step down      Swissball wall rolls- in SLS- hip drive      Quad hip ext/wall-ball rolls                  Manual Intervention (19144)  Min: 25 min      Knee mobs/PROM      Tib/Fem Mobs      Patella Mobs      Ankle mobs  10 TC, mid foot and rearfoot   STM gastroc, posterior tib  15          NMR re-education (12672)  Min:   CUES NEEDED   Italian/Biofeedback 10/10      BFR      G. Med activation      Hip Ext full ROM/ G.  Activation      Bosu Bal and Prop- G Med      Single leg stance/Balance/Prop      Bosu Retro G. Med act      Prone Hip froggers- sliders/elevated            Therapeutic Activity (67203)  Min:      Ladders      Plyos      Dynamic Balance                            Therapeutic Exercise and NMR EXR  [x] (47854) Provided verbal/tactile cueing for activities related to strengthening, flexibility, endurance, ROM for improvements in LE, proximal hip, and core control with self care, mobility, lifting, ambulation. [x] (38430) Provided verbal/tactile cueing for activities related to improving balance, coordination, kinesthetic sense, posture, motor skill, proprioception  to assist with LE, proximal hip, and core control in self care, mobility, lifting, ambulation and eccentric single leg control. NMR and Therapeutic Activities:    [x] (04293 or 92819) Provided verbal/tactile cueing for activities related to improving balance, coordination, kinesthetic sense, posture, motor skill, proprioception and motor activation to allow for proper function of core, proximal hip and LE with self care and ADLs and functional mobility.   [] (83996) Gait Re-education- Provided training and instruction to the patient for proper LE, core and proximal hip recruitment and positioning and eccentric body weight control with ambulation re-education including up and down stairs     Home Exercise Program:    [x] (75534) Reviewed/Progressed HEP activities related to strengthening, flexibility, endurance, ROM of core, proximal hip and LE for functional self-care, mobility, lifting and ambulation/stair navigation   [] (87428)Reviewed/Progressed HEP activities related to improving balance, coordination, kinesthetic sense, posture, motor skill, proprioception of core, proximal hip and LE for self care, mobility, lifting, and ambulation/stair navigation      Manual Treatments:  PROM / STM / Oscillations-Mobs:  G-I, II, III, IV (PA's, Inf., Post.)  [x] (50369) Provided manual therapy to mobilize LE, proximal hip and/or LS spine soft tissue/joints for the purpose of modulating pain, promoting relaxation,  increasing ROM, reducing/eliminating soft tissue swelling/inflammation/restriction, improving soft tissue extensibility and allowing for proper ROM for normal function with self care, mobility, lifting and ambulation.      Modalities:     [] GAME READY (VASO)- for significant edema, swelling, pain control. Charges:  Timed Code Treatment Minutes: 45   Total Treatment Minutes: 45      [] EVAL (LOW) 68296 (typically 20 minutes face-to-face)  [] EVAL (MOD) 66344 (typically 30 minutes face-to-face)  [] EVAL (HIGH) 44450 (typically 45 minutes face-to-face)  [] RE-EVAL     [x] DO(10913) x 1 KX    [] DRY NEEDLE 1 OR 2 MUSCLES  [] NMR (58921) x     [] DRY NEEDLE 3+ MUSCLES  [x] Manual (19612) x 2  KX    [] TA (00225) x     [] Mech Traction (55098)  [] ES(attended) (88272)     [] ES (un) (57949):   [] VASO (72719)  [] Other:    If BWC Please Indicate Time In/Out  CPT Code Time in Time out                                   OALS:  Patient stated goal: walk without pain  [x] Progressing: [] Met: [] Not Met: [] Adjusted    Therapist goals for Patient:   Short Term Goals: To be achieved in: 2 weeks  1. Independent in HEP and progression per patient tolerance, in order to prevent re-injury. [] Progressing: [x] Met: [] Not Met: [] Adjusted  2. Patient will have a decrease in pain to facilitate improvement in movement, function, and ADLs as indicated by Functional Deficits. [x] Progressing: [] Met: [] Not Met: [] Adjusted    Long Term Goals: To be achieved in: 4 weeks  1. Disability index score of 40% or less for the LEFS to assist with reaching prior level of function. [] Progressing: [] Met: [] Not Met: [] Adjusted  2. Patient will demonstrate increased AROM to 0-10 ankle DF to allow for proper joint functioning as indicated by patients Functional Deficits. [x] Progressing: [] Met: [] Not Met: [] Adjusted  3. Patient will demonstrate an increase in Strength to good proximal hip strength and control, within 5lb HHD in LE to allow for proper functional mobility as indicated by patients Functional Deficits. [x] Progressing: [] Met: [] Not Met: [] Adjusted  4. Patient will return to standing and walking functional activities without increased symptoms or restriction.    [x] Progressing: [] Met: [] Not Met: [] Adjusted  5. Patient will report  that she is able to shag volleyballs for > 1 hr without increased symptoms or restriction. (patient specific functional goal)    [x] Progressing: [] Met: [] Not Met: [] Adjusted     ASSESSMENT:  Added felt to posterior of shoe to keep orthotic in place to test if this feels better for little toe. If so, may send back to lab to correct. Patient with less tenderness in posterior tib and with improved tolerance to soft tissue mobilization. Did well with all strengthening. Cues for form and improved control bilaterally. Needs further improved stability, rotational movements and balance during her normal ADLs and with shagging volleyball. Return to Play: (if applicable)   []  Stage 1: Intro to Strength   []  Stage 2: Return to Run and Strength   []  Stage 3: Return to Jump and Strength   []  Stage 4: Dynamic Strength and Agility   []  Stage 5: Sport Specific Training     []  Ready to Return to Play, Meets All Above Stages   []  Not Ready for Return to Sports   Comments:            Treatment/Activity Tolerance:  [x] Patient tolerated treatment well [] Patient limited by fatique  [] Patient limited by pain  [] Patient limited by other medical complications  [] Other:     Overall Progression Towards Functional goals/ Treatment Progress Update:  [x] Patient is progressing as expected towards functional goals listed. [] Progression is slowed due to complexities/Impairments listed. [] Progression has been slowed due to co-morbidities.   [] Plan just implemented, too soon to assess goals progression <30days   [] Goals require adjustment due to lack of progress  [] Patient is not progressing as expected and requires additional follow up with physician  [] Other    Prognosis for POC: [x] Good [] Fair  [] Poor    Patient requires continued skilled intervention: [x] Yes  [] No        PLAN:   [x] Continue per plan of care [] Alter current plan (see comments)  [] Plan of care initiated [] Hold pending MD visit [] Discharge    Electronically signed by: Qiana Torres, PT    Note: If patient does not return for scheduled/recommended follow up visits, this note will serve as a discharge from care along with the most recent update on progress.

## 2021-12-20 ENCOUNTER — HOSPITAL ENCOUNTER (OUTPATIENT)
Dept: PHYSICAL THERAPY | Age: 71
Setting detail: THERAPIES SERIES
Discharge: HOME | End: 2021-12-20
Payer: MEDICARE

## 2021-12-20 PROCEDURE — 97110 THERAPEUTIC EXERCISES: CPT

## 2021-12-20 PROCEDURE — 97140 MANUAL THERAPY 1/> REGIONS: CPT

## 2021-12-20 NOTE — FLOWSHEET NOTE
[address] [place] [address]  Phone: [phone] Fax: [phone]        Physical Therapy Treatment Note/ Progress Report:     Date:  2021    Patient Name:  Kaykay Amezcua    :  1950  MRN: [id number]  Restrictions/Precautions:    Medical/Treatment Diagnosis Information:  Diagnosis: R foot pain, bilateral plantar fascitis  Treatment Diagnosis: bilateral plantar fascitis M72.2, R foot pain T90.974  Insurance/Certification information:  PT Insurance Information: Medicare//Aetna  Physician Information:  Referring Practitioner: Dr Aleja Callejas of care signed (Y/N):     Date of Patient follow up with Physician:      Progress Report: [x]  Yes  []  No     Date Range for reporting period:  Beginnin2021  Ending:  -    Progress report due (10 Rx/or 30 days whichever is less):     Recertification due (POC duration/ or 90 days whichever is less): 2021     Visit # Insurance Allowable Auth Needed   18 Medicare/Aetna []Yes   [x]No     Latex Allergy:  [x]NO      []YES  Preferred Language for Healthcare:   [x]English       []other:  Functional Scale: LEFS 50% disability Date assessed:2021    Pain level: 3-4/10     SUBJECTIVE:  Patient reports that her R foot has been feeling some better with the adjustments to the orthotics.  Still having pressure along small toe on R.             OBJECTIVE:     Current footwear: Ramone  ROM LEFT RIGHT     Ankle PF 60 70   Ankle DF 8 5   Ankle In 15 50   Ankle Ev 15 20   Strength  LEFT RIGHT   Ankle DF  5 4+   Ankle PF  5 4+   Ankle Inv  4+ 4+   Ankle EV  4+ 4          LEFT RIGHT   Rearfoot position neutral neutral   Forefoot position neutral varus   1st Ray position neutral neutral   1st Ray mobility flexible flexible   Calcaneal position standing     Calcaneal position squatting     Total pronation       ROM LEFT RIGHT   DF knee bent [facility]   DF knee straight [facility] Deep Squat: dysfunctional, painful  SLS- eyes open: not tested  SLS- eyes closed: not tested  Foot strike: flat foot position  Pronation: accelerated      RESTRICTIONS/PRECAUTIONS: R THR     Exercises/Interventions:     Therapeutic Ex (42055)   Min: 20 Sets/sec Reps Notes/CUES   Retro Stepper/BIKE      Gastroc stretch 30 3 Cues for form and intensity/duration, with rotation to each side   Standing posterior tib activation 0  bilat   Standing toe and heel raise-with shoe and orthotic donned 0  double leg   Seated toe curls  0  bilat   Ankle 4 way with blue TB (latex free) 2 10    Standing SL corner rotation 1 15 bilat   Double leg HR with eccentric lowering with ws to each side 1 15 bilat   Ambulation with shoes/orthotics      Quick rotation at corner 0     SLS with alt march on airex pad 0     Leg Press Iso/Con/Ecc 0-      Cybex HS curl      TKE      Glute side walks      RDL      BOSU Lunge 10sec 5    Slide HS eccentrics      Step ups/ecc step down      Swissball wall rolls- in SLS- hip drive      Quad hip ext/wall-ball rolls                  Manual Intervention (36222)  Min: 25 min      Knee mobs/PROM      Tib/Fem Mobs      Patella Mobs      Ankle mobs  10 TC, mid foot and rearfoot   STM gastroc, posterior tib  15          NMR re-education (98568)  Min:   CUES NEEDED   Gambian/Biofeedback 10/10      BFR      G. Med activation      Hip Ext full ROM/ G. Activation      Bosu Bal and Prop- G Med      Single leg stance/Balance/Prop      Bosu Retro G. Med act      Prone Hip froggers- sliders/elevated            Therapeutic Activity (70849)  Min:      Ladders      Plyos      Dynamic Balance                            Therapeutic Exercise and NMR EXR  [x] (75843) Provided verbal/tactile cueing for activities related to strengthening, flexibility, endurance, ROM for improvements in LE, proximal hip, and core control with self care, mobility, lifting, ambulation.   [x] (88290) Provided verbal/tactile cueing for activities related to improving balance, coordination, kinesthetic sense, posture, motor skill, proprioception  to assist with LE, proximal hip, and core control in self care, mobility, lifting, ambulation and eccentric single leg control. NMR and Therapeutic Activities:    [x] (24238 or 22673) Provided verbal/tactile cueing for activities related to improving balance, coordination, kinesthetic sense, posture, motor skill, proprioception and motor activation to allow for proper function of core, proximal hip and LE with self care and ADLs and functional mobility.   [] (63488) Gait Re-education- Provided training and instruction to the patient for proper LE, core and proximal hip recruitment and positioning and eccentric body weight control with ambulation re-education including up and down stairs     Home Exercise Program:    [x] (19742) Reviewed/Progressed HEP activities related to strengthening, flexibility, endurance, ROM of core, proximal hip and LE for functional self-care, mobility, lifting and ambulation/stair navigation   [] (27405)Reviewed/Progressed HEP activities related to improving balance, coordination, kinesthetic sense, posture, motor skill, proprioception of core, proximal hip and LE for self care, mobility, lifting, and ambulation/stair navigation      Manual Treatments:  PROM / STM / Oscillations-Mobs:  G-I, II, III, IV (PA's, Inf., Post.)  [x] (37099) Provided manual therapy to mobilize LE, proximal hip and/or LS spine soft tissue/joints for the purpose of modulating pain, promoting relaxation,  increasing ROM, reducing/eliminating soft tissue swelling/inflammation/restriction, improving soft tissue extensibility and allowing for proper ROM for normal function with self care, mobility, lifting and ambulation. Modalities:     [] GAME READY (VASO)- for significant edema, swelling, pain control.      Charges:  Timed Code Treatment Minutes: 45   Total Treatment Minutes: 45      [] EVAL (LOW) 06231 (typically 20 minutes face-to-face)  [] EVAL (MOD) 50333 (typically 30 minutes face-to-face)  [] EVAL (HIGH) 19926 (typically 45 minutes face-to-face)  [] RE-EVAL     [x] YJ(98998) x 1 KX    [] DRY NEEDLE 1 OR 2 MUSCLES  [] NMR (01202) x     [] DRY NEEDLE 3+ MUSCLES  [x] Manual (94837) x 2  KX    [] TA (27135) x     [] Mech Traction (51894)  [] ES(attended) (50046)     [] ES (un) (97629):   [] VASO (02785)  [] Other:    If BWC Please Indicate Time In/Out  CPT Code Time in Time out                                   OALS:  Patient stated goal: walk without pain  [x] Progressing: [] Met: [] Not Met: [] Adjusted    Therapist goals for Patient:   Short Term Goals: To be achieved in: 2 weeks  1. Independent in HEP and progression per patient tolerance, in order to prevent re-injury. [] Progressing: [x] Met: [] Not Met: [] Adjusted  2. Patient will have a decrease in pain to facilitate improvement in movement, function, and ADLs as indicated by Functional Deficits. [x] Progressing: [] Met: [] Not Met: [] Adjusted    Long Term Goals: To be achieved in: 4 weeks  1. Disability index score of 40% or less for the LEFS to assist with reaching prior level of function. [] Progressing: [] Met: [] Not Met: [] Adjusted  2. Patient will demonstrate increased AROM to 0-10 ankle DF to allow for proper joint functioning as indicated by patients Functional Deficits. [x] Progressing: [] Met: [] Not Met: [] Adjusted  3. Patient will demonstrate an increase in Strength to good proximal hip strength and control, within 5lb HHD in LE to allow for proper functional mobility as indicated by patients Functional Deficits. [x] Progressing: [] Met: [] Not Met: [] Adjusted  4. Patient will return to standing and walking functional activities without increased symptoms or restriction. [x] Progressing: [] Met: [] Not Met: [] Adjusted  5.  Patient will report  that she is able to shag volleyballs for > 1 hr without increased symptoms or restriction. (patient specific functional goal)    [x] Progressing: [] Met: [] Not Met: [] Adjusted     ASSESSMENT: Posterior tib still with increased soft tissue restriction, however continues to lessen and improve with less manual therapy to area. Good tolerance to soft tissue mobilization. Did well with all strengthening. Cues for form and improved control bilaterally. Needs further improved stability, rotational movements and balance during her normal ADLs and with shagging volleyball. Return to Play: (if applicable)   []  Stage 1: Intro to Strength   []  Stage 2: Return to Run and Strength   []  Stage 3: Return to Jump and Strength   []  Stage 4: Dynamic Strength and Agility   []  Stage 5: Sport Specific Training     []  Ready to Return to Play, Meets All Above Stages   []  Not Ready for Return to Sports   Comments:            Treatment/Activity Tolerance:  [x] Patient tolerated treatment well [] Patient limited by fatique  [] Patient limited by pain  [] Patient limited by other medical complications  [] Other:     Overall Progression Towards Functional goals/ Treatment Progress Update:  [x] Patient is progressing as expected towards functional goals listed. [] Progression is slowed due to complexities/Impairments listed. [] Progression has been slowed due to co-morbidities.   [] Plan just implemented, too soon to assess goals progression <30days   [] Goals require adjustment due to lack of progress  [] Patient is not progressing as expected and requires additional follow up with physician  [] Other    Prognosis for POC: [x] Good [] Fair  [] Poor    Patient requires continued skilled intervention: [x] Yes  [] No        PLAN:   [x] Continue per plan of care [] Alter current plan (see comments)  [] Plan of care initiated [] Hold pending MD visit [] Discharge    Electronically signed by: Lyndsay Corrales PT    Note: If patient does not return for scheduled/recommended follow up visits, this note will serve as a discharge from care along with the most recent update on progress.

## 2021-12-27 ENCOUNTER — HOSPITAL ENCOUNTER (OUTPATIENT)
Dept: PHYSICAL THERAPY | Age: 71
Setting detail: THERAPIES SERIES
Discharge: HOME | End: 2021-12-27
Payer: MEDICARE

## 2021-12-27 PROCEDURE — 97140 MANUAL THERAPY 1/> REGIONS: CPT

## 2021-12-27 PROCEDURE — 97110 THERAPEUTIC EXERCISES: CPT

## 2021-12-27 NOTE — FLOWSHEET NOTE
[address] [place] [address] [place] [address]  Phone: [phone] Fax: [phone]        Physical Therapy Treatment Note/ Progress Report:     Date:  2021    Patient Name:  Alphonse Aase    :  1950  MRN: [id number]  Restrictions/Precautions:    Medical/Treatment Diagnosis Information:  Diagnosis: R foot pain, bilateral plantar fascitis  Treatment Diagnosis: bilateral plantar fascitis M72.2, R foot pain N06.187  Insurance/Certification information:  PT Insurance Information: Medicare//Aetna  Physician Information:  Referring Practitioner: Dr Linnea Lei of care signed (Y/N):     Date of Patient follow up with Physician:      Progress Report: [x]  Yes  []  No     Date Range for reporting period:  Beginnin2021  Ending:  -    Progress report due (10 Rx/or 30 days whichever is less):     Recertification due (POC duration/ or 90 days whichever is less): 2021     Visit # Insurance Allowable Auth Needed   19 Medicare/Aetna []Yes   [x]No     Latex Allergy:  [x]NO      []YES  Preferred Language for Healthcare:   [x]English       []other:  Functional Scale: LEFS 50% disability Date assessed:2021    Pain level: 3-4/10     SUBJECTIVE:  Patient reports that her R foot has been feeling some better. Has been having a lot of other things going on over the past few days which is adding to her stress/anxiety level.               OBJECTIVE:     Current footwear: Ramone  ROM LEFT RIGHT     Ankle PF 60 70   Ankle DF 8 5   Ankle In 15 50   Ankle Ev 15 20   Strength  LEFT RIGHT   Ankle DF  5 4+   Ankle PF  5 4+   Ankle Inv  4+ 4+   Ankle EV  4+ 4          LEFT RIGHT   Rearfoot position neutral neutral   Forefoot position neutral varus   1st Ray position neutral neutral   1st Ray mobility flexible flexible   Calcaneal position standing     Calcaneal position squatting     Total pronation       ROM LEFT RIGHT   DF knee bent [facility] DF knee straight [facility] WFL               Deep Squat: dysfunctional, painful  SLS- eyes open: not tested  SLS- eyes closed: not tested  Foot strike: flat foot position  Pronation: accelerated      RESTRICTIONS/PRECAUTIONS: R THR     Exercises/Interventions:     Therapeutic Ex (25846)   Min: 20 Sets/sec Reps Notes/CUES   Retro Stepper/BIKE      Gastroc stretch 30 3 Cues for form and intensity/duration, with rotation to each side   Standing posterior tib activation 0  bilat   Standing toe and heel raise-with shoe and orthotic donned 0  double leg   Seated toe curls  0  bilat   Ankle 4 way with blue TB (latex free) 2 10    Standing SL corner rotation 1 15 bilat   Double leg HR with eccentric lowering with ws to each side 1 15 bilat   Ambulation with shoes/orthotics      Quick rotation at corner 0     SLS with alt march on airex pad 0     Leg Press Iso/Con/Ecc 0-      Cybex HS curl      TKE      Glute side walks      RDL      BOSU Lunge 10sec 5    Slide HS eccentrics      Step ups/ecc step down      Swissball wall rolls- in SLS- hip drive      Quad hip ext/wall-ball rolls                  Manual Intervention (18307)  Min: 25 min      Knee mobs/PROM      Tib/Fem Mobs      Patella Mobs      Ankle mobs  10 TC, mid foot and rearfoot   STM gastroc, posterior tib  15          NMR re-education (32398)  Min:   CUES NEEDED   Colombian/Biofeedback 10/10      BFR      G. Med activation      Hip Ext full ROM/ G. Activation      Bosu Bal and Prop- G Med      Single leg stance/Balance/Prop      Bosu Retro G. Med act      Prone Hip froggers- sliders/elevated            Therapeutic Activity (59039)  Min:      Ladders      Plyos      Dynamic Balance                            Therapeutic Exercise and NMR EXR  [x] (39506) Provided verbal/tactile cueing for activities related to strengthening, flexibility, endurance, ROM for improvements in LE, proximal hip, and core control with self care, mobility, lifting, ambulation.   [x] (03746) Provided verbal/tactile cueing for activities related to improving balance, coordination, kinesthetic sense, posture, motor skill, proprioception  to assist with LE, proximal hip, and core control in self care, mobility, lifting, ambulation and eccentric single leg control. NMR and Therapeutic Activities:    [x] (73593 or 16338) Provided verbal/tactile cueing for activities related to improving balance, coordination, kinesthetic sense, posture, motor skill, proprioception and motor activation to allow for proper function of core, proximal hip and LE with self care and ADLs and functional mobility.   [] (09172) Gait Re-education- Provided training and instruction to the patient for proper LE, core and proximal hip recruitment and positioning and eccentric body weight control with ambulation re-education including up and down stairs     Home Exercise Program:    [x] (31002) Reviewed/Progressed HEP activities related to strengthening, flexibility, endurance, ROM of core, proximal hip and LE for functional self-care, mobility, lifting and ambulation/stair navigation   [] (59241)Reviewed/Progressed HEP activities related to improving balance, coordination, kinesthetic sense, posture, motor skill, proprioception of core, proximal hip and LE for self care, mobility, lifting, and ambulation/stair navigation      Manual Treatments:  PROM / STM / Oscillations-Mobs:  G-I, II, III, IV (PA's, Inf., Post.)  [x] (88020) Provided manual therapy to mobilize LE, proximal hip and/or LS spine soft tissue/joints for the purpose of modulating pain, promoting relaxation,  increasing ROM, reducing/eliminating soft tissue swelling/inflammation/restriction, improving soft tissue extensibility and allowing for proper ROM for normal function with self care, mobility, lifting and ambulation. Modalities:     [] GAME READY (VASO)- for significant edema, swelling, pain control.      Charges:  Timed Code Treatment Minutes: 45   Total Treatment Minutes: 45      [] EVAL (LOW) 45812 (typically 20 minutes face-to-face)  [] EVAL (MOD) 55098 (typically 30 minutes face-to-face)  [] EVAL (HIGH) 81693 (typically 45 minutes face-to-face)  [] RE-EVAL     [x] RL(17923) x 1 KX    [] DRY NEEDLE 1 OR 2 MUSCLES  [] NMR (56104) x     [] DRY NEEDLE 3+ MUSCLES  [x] Manual (81128) x 2  KX    [] TA (49205) x     [] Mech Traction (32488)  [] ES(attended) (17309)     [] ES (un) (09160):   [] VASO (84470)  [] Other:    If BWC Please Indicate Time In/Out  CPT Code Time in Time out                                   OALS:  Patient stated goal: walk without pain  [x] Progressing: [] Met: [] Not Met: [] Adjusted    Therapist goals for Patient:   Short Term Goals: To be achieved in: 2 weeks  1. Independent in HEP and progression per patient tolerance, in order to prevent re-injury. [] Progressing: [x] Met: [] Not Met: [] Adjusted  2. Patient will have a decrease in pain to facilitate improvement in movement, function, and ADLs as indicated by Functional Deficits. [x] Progressing: [] Met: [] Not Met: [] Adjusted    Long Term Goals: To be achieved in: 4 weeks  1. Disability index score of 40% or less for the LEFS to assist with reaching prior level of function. [] Progressing: [] Met: [] Not Met: [] Adjusted  2. Patient will demonstrate increased AROM to 0-10 ankle DF to allow for proper joint functioning as indicated by patients Functional Deficits. [x] Progressing: [] Met: [] Not Met: [] Adjusted  3. Patient will demonstrate an increase in Strength to good proximal hip strength and control, within 5lb HHD in LE to allow for proper functional mobility as indicated by patients Functional Deficits. [x] Progressing: [] Met: [] Not Met: [] Adjusted  4. Patient will return to standing and walking functional activities without increased symptoms or restriction. [x] Progressing: [] Met: [] Not Met: [] Adjusted  5.  Patient will report  that she is able to [facility] for > 1 hr without increased symptoms or restriction. (patient specific functional goal)    [x] Progressing: [] Met: [] Not Met: [] Adjusted     ASSESSMENT: Posterior tib continues to feel improved, with decreasing soft tissue irritation in tissue- feeling more proximally opposed to mid belly of muscle today. Also tight into medial gastroc head. Good tolerance to soft tissue mobilization. Did well with all strengthening. Cues for form and improved control bilaterally. Needs further improved stability, rotational movements and balance during her normal ADLs and with shagging volleyball. Return to Play: (if applicable)   []  Stage 1: Intro to Strength   []  Stage 2: Return to Run and Strength   []  Stage 3: Return to Jump and Strength   []  Stage 4: Dynamic Strength and Agility   []  Stage 5: Sport Specific Training     []  Ready to Return to Play, Meets All Above Stages   []  Not Ready for Return to Sports   Comments:            Treatment/Activity Tolerance:  [x] Patient tolerated treatment well [] Patient limited by fatique  [] Patient limited by pain  [] Patient limited by other medical complications  [] Other:     Overall Progression Towards Functional goals/ Treatment Progress Update:  [x] Patient is progressing as expected towards functional goals listed. [] Progression is slowed due to complexities/Impairments listed. [] Progression has been slowed due to co-morbidities.   [] Plan just implemented, too soon to assess goals progression <30days   [] Goals require adjustment due to lack of progress  [] Patient is not progressing as expected and requires additional follow up with physician  [] Other    Prognosis for POC: [x] Good [] Fair  [] Poor    Patient requires continued skilled intervention: [x] Yes  [] No        PLAN:   [x] Continue per plan of care [] Alter current plan (see comments)  [] Plan of care initiated [] Hold pending MD visit [] Discharge    Electronically signed by: Cony Ortega PT    Note: If patient does not return for scheduled/recommended follow up visits, this note will serve as a discharge from care along with the most recent update on progress.

## 2022-01-03 ENCOUNTER — APPOINTMENT (OUTPATIENT)
Dept: PHYSICAL THERAPY | Age: 72
End: 2022-01-03
Payer: MEDICARE

## 2022-01-10 ENCOUNTER — HOSPITAL ENCOUNTER (OUTPATIENT)
Dept: PHYSICAL THERAPY | Age: 72
Setting detail: THERAPIES SERIES
Discharge: HOME | End: 2022-01-10
Payer: MEDICARE

## 2022-01-10 PROCEDURE — 97140 MANUAL THERAPY 1/> REGIONS: CPT

## 2022-01-10 PROCEDURE — 97110 THERAPEUTIC EXERCISES: CPT

## 2022-01-10 NOTE — FLOWSHEET NOTE
[address]  Phone: [phone] Fax: [phone]        Physical Therapy Treatment Note/ Progress Report:     Date:  2021    Patient Name:  Yamilka Fisher    :  1950  MRN: [id number]  Restrictions/Precautions:    Medical/Treatment Diagnosis Information:  Diagnosis: R foot pain, bilateral plantar fascitis  Treatment Diagnosis: bilateral plantar fascitis M72.2, R foot pain T84.378  Insurance/Certification information:  PT Insurance Information: Medicare//Aetna  Physician Information:  Referring Practitioner: Dr Kelly Zamarripa of care signed (Y/N):     Date of Patient follow up with Physician:      Progress Report: [x]  Yes  []  No     Date Range for reporting period:  Beginnin2021  Ending:  -    Progress report due (10 Rx/or 30 days whichever is less): 547    Recertification due (POC duration/ or 90 days whichever is less): 2021     Visit # Insurance Allowable Auth Needed   20 Medicare/Aetna []Yes   [x]No     Latex Allergy:  [x]NO      []YES  Preferred Language for Healthcare:   [x]English       []other:  Functional Scale: LEFS 50% disability Date assessed:2021    Pain level: 3-4/10     SUBJECTIVE:  Patient reports that her R foot has been feeling some better. Had corn removed from podiatrist. Having a lot of stress/anxiety due to insurance. States that she hasn't been going to PT for her levator ani as she is trying to get approved for part [employer].  .              OBJECTIVE:     Current footwear: Ramone  ROM LEFT RIGHT     Ankle PF 60 70   Ankle DF 8 5   Ankle In 15 50   Ankle Ev 15 20   Strength  LEFT RIGHT   Ankle DF  5 4+   Ankle PF  5 4+   Ankle Inv  4+ 4+   Ankle EV  4+ 4          LEFT RIGHT   Rearfoot position neutral neutral   Forefoot position neutral varus   1st Ray position neutral neutral   1st Ray mobility flexible flexible   Calcaneal position standing     Calcaneal pain control. Charges:  Timed Code Treatment Minutes: 45   Total Treatment Minutes: 45      [] EVAL (LOW) 00802 (typically 20 minutes face-to-face)  [] EVAL (MOD) 98150 (typically 30 minutes face-to-face)  [] EVAL (HIGH) 32265 (typically 45 minutes face-to-face)  [] RE-EVAL     [x] AN(54471) x 1    [] DRY NEEDLE 1 OR 2 MUSCLES  [] NMR (55557) x     [] DRY NEEDLE 3+ MUSCLES  [x] Manual (97042) x 2      [] TA (74980) x     [] Mech Traction (97651)  [] ES(attended) (19730)     [] ES (un) (62614):   [] VASO (05830)  [] Other:    If BWC Please Indicate Time In/Out  CPT Code Time in Time out                                   OALS:  Patient stated goal: walk without pain  [x] Progressing: [] Met: [] Not Met: [] Adjusted    Therapist goals for Patient:   Short Term Goals: To be achieved in: 2 weeks  1. Independent in HEP and progression per patient tolerance, in order to prevent re-injury. [] Progressing: [x] Met: [] Not Met: [] Adjusted  2. Patient will have a decrease in pain to facilitate improvement in movement, function, and ADLs as indicated by Functional Deficits. [x] Progressing: [] Met: [] Not Met: [] Adjusted    Long Term Goals: To be achieved in: 4 weeks  1. Disability index score of 40% or less for the LEFS to assist with reaching prior level of function. [] Progressing: [] Met: [] Not Met: [] Adjusted  2. Patient will demonstrate increased AROM to 0-10 ankle DF to allow for proper joint functioning as indicated by patients Functional Deficits. [x] Progressing: [] Met: [] Not Met: [] Adjusted  3. Patient will demonstrate an increase in Strength to good proximal hip strength and control, within 5lb HHD in LE to allow for proper functional mobility as indicated by patients Functional Deficits. [x] Progressing: [] Met: [] Not Met: [] Adjusted  4. Patient will return to standing and walking functional activities without increased symptoms or restriction.    [x] Progressing: [] Met: [] Not Met: [] Adjusted  5. Patient will report  that she is able to shag volleyballs for > 1 hr without increased symptoms or restriction. (patient specific functional goal)    [x] Progressing: [] Met: [] Not Met: [] Adjusted     ASSESSMENT: Posterior tib continues to feel improved, very little restriction in calf today. Patient did well with all strengthening. Improving overall control. Slight bit of tightness into medial gastroc head. Good tolerance to soft tissue mobilization. Did well with all strengthening. Cues for form and improved control bilaterally. Needs further improved stability, rotational movements and balance during her normal ADLs and with shagging volleyball. Will be having new orthotic devices made by orthotist for proper length at 9.5 shoe so patient doesn't need to have spacer in back of heel any longer. Return to Play: (if applicable)   []  Stage 1: Intro to Strength   []  Stage 2: Return to Run and Strength   []  Stage 3: Return to Jump and Strength   []  Stage 4: Dynamic Strength and Agility   []  Stage 5: Sport Specific Training     []  Ready to Return to Play, Meets All Above Stages   []  Not Ready for Return to Sports   Comments:            Treatment/Activity Tolerance:  [x] Patient tolerated treatment well [] Patient limited by fatique  [] Patient limited by pain  [] Patient limited by other medical complications  [] Other:     Overall Progression Towards Functional goals/ Treatment Progress Update:  [x] Patient is progressing as expected towards functional goals listed. [] Progression is slowed due to complexities/Impairments listed. [] Progression has been slowed due to co-morbidities.   [] Plan just implemented, too soon to assess goals progression <30days   [] Goals require adjustment due to lack of progress  [] Patient is not progressing as expected and requires additional follow up with physician  [] Other    Prognosis for POC: [x] Good [] Fair  [] Poor    Patient requires continued skilled intervention: [x] Yes  [] No        PLAN:   [x] Continue per plan of care [] Alter current plan (see comments)  [] Plan of care initiated [] Hold pending MD visit [] Discharge    Electronically signed by: Alonso Fry PT    Note: If patient does not return for scheduled/recommended follow up visits, this note will serve as a discharge from care along with the most recent update on progress.

## 2022-01-17 ENCOUNTER — HOSPITAL ENCOUNTER (OUTPATIENT)
Dept: PHYSICAL THERAPY | Age: 72
Setting detail: THERAPIES SERIES
Discharge: HOME | End: 2022-01-17
Payer: MEDICARE

## 2022-01-17 PROCEDURE — 97110 THERAPEUTIC EXERCISES: CPT

## 2022-01-17 PROCEDURE — 97140 MANUAL THERAPY 1/> REGIONS: CPT

## 2022-01-17 NOTE — FLOWSHEET NOTE
[place] [address] Huizar  Phone: [phone] Fax: [phone]        Physical Therapy Treatment Note/ Progress Report:     Date:  2021    Patient Name:  Familia Akers    :  1950  MRN: [id number]  Restrictions/Precautions:    Medical/Treatment Diagnosis Information:  Diagnosis: R foot pain, bilateral plantar fascitis  Treatment Diagnosis: bilateral plantar fascitis M72.2, R foot pain J97.171  Insurance/Certification information:  PT Insurance Information: Medicare//Aetna  Physician Information:  Referring Practitioner: Dr Ledy Santiago of care signed (Y/N):     Date of Patient follow up with Physician:      Progress Report: [x]  Yes  []  No     Date Range for reporting period:  Beginnin2021  Ending:  -    Progress report due (10 Rx/or 30 days whichever is less): 65/3/7032    Recertification due (POC duration/ or 90 days whichever is less): 2021     Visit # Insurance Allowable Auth Needed   21 Medicare/Aetna []Yes   [x]No     Latex Allergy:  [x]NO      []YES  Preferred Language for Healthcare:   [x]English       []other:  Functional Scale: LEFS 50% disability Date assessed:2021    Pain level: 1-2/10     SUBJECTIVE:  Patient reports that her R foot has been feeling some better. Had a little bit of spasm in calf the other day while walking, but wasn't bad and didn't last. States that she has been doing pretty good overall with foot. Has not started back to volleyball yet.                OBJECTIVE:     Current footwear: Ramone  ROM LEFT RIGHT     Ankle PF 60 70   Ankle DF 8 5   Ankle In 15 50   Ankle Ev 15 20   Strength  LEFT RIGHT   Ankle DF  5 4+   Ankle PF  5 4+   Ankle Inv  4+ 4+   Ankle EV  4+ 4          LEFT RIGHT   Rearfoot position neutral neutral   Forefoot position neutral varus   1st Ray position neutral neutral   1st Ray mobility flexible flexible   Calcaneal position standing     Calcaneal position squatting     Total pronation       ROM LEFT RIGHT   DF knee bent [facility] [place] [place]   DF knee straight [facility] W[facility]               Deep Squat: dysfunctional, painful  SLS- eyes open: not tested  SLS- eyes closed: not tested  Foot strike: flat foot position  Pronation: accelerated      RESTRICTIONS/PRECAUTIONS: R THR     Exercises/Interventions:     Therapeutic Ex (10194)   Min: 25 Sets/sec Reps Notes/CUES   Retro Stepper/BIKE      Gastroc stretch 30 3 Cues for form and intensity/duration, with rotation to each side   Standing posterior tib activation 0  bilat   Standing toe and heel raise-with shoe and orthotic donned 0  double leg   Seated toe curls  0  bilat   Ankle 4 way with blue TB (latex free) 2 10 (progress band next visit)   Standing SL corner rotation 1 15 bilat   Double leg HR with eccentric lowering with ws to each side 1 15 bilat   Ambulation with shoes/orthotics      Quick rotation at corner 0     SLS with alt march on airex pad 0     Leg Press Iso/Con/Ecc 0-      Cybex HS curl      TKE      Glute side walks      RDL      BOSU Lunge 20 sec 4    Slide HS eccentrics      Step ups/ecc step down      Swissball wall rolls- in SLS- hip drive      Quad hip ext/wall-ball rolls                  Manual Intervention (04645)  Min: 18 min      Knee mobs/PROM      Tib/Fem Mobs      Patella Mobs      Ankle mobs  8 TC, mid foot and rearfoot   STM gastroc, posterior tib  10          NMR re-education (94705)  Min:   CUES NEEDED   Ukrainian/Biofeedback 10/10      BFR      G. Med activation      Hip Ext full ROM/ G.  Activation      Bosu Bal and Prop- G Med      Single leg stance/Balance/Prop      Bosu Retro G. Med act      Prone Hip froggers- sliders/elevated            Therapeutic Activity (63481)  Min:      Ladders      Plyos      Dynamic Balance                            Therapeutic Exercise and NMR EXR  [x] (10031) Provided verbal/tactile cueing for activities related to strengthening, flexibility, endurance, ROM for improvements in LE, proximal hip, and core control with self care, mobility, lifting, ambulation. [x] (22642) Provided verbal/tactile cueing for activities related to improving balance, coordination, kinesthetic sense, posture, motor skill, proprioception  to assist with LE, proximal hip, and core control in self care, mobility, lifting, ambulation and eccentric single leg control. NMR and Therapeutic Activities:    [x] (97249 or 41716) Provided verbal/tactile cueing for activities related to improving balance, coordination, kinesthetic sense, posture, motor skill, proprioception and motor activation to allow for proper function of core, proximal hip and LE with self care and ADLs and functional mobility.   [] (03632) Gait Re-education- Provided training and instruction to the patient for proper LE, core and proximal hip recruitment and positioning and eccentric body weight control with ambulation re-education including up and down stairs     Home Exercise Program:    [x] (66730) Reviewed/Progressed HEP activities related to strengthening, flexibility, endurance, ROM of core, proximal hip and LE for functional self-care, mobility, lifting and ambulation/stair navigation   [] (29337)Reviewed/Progressed HEP activities related to improving balance, coordination, kinesthetic sense, posture, motor skill, proprioception of core, proximal hip and LE for self care, mobility, lifting, and ambulation/stair navigation      Manual Treatments:  PROM / STM / Oscillations-Mobs:  G-I, II, III, IV (PA's, Inf., Post.)  [x] (95151) Provided manual therapy to mobilize LE, proximal hip and/or LS spine soft tissue/joints for the purpose of modulating pain, promoting relaxation,  increasing ROM, reducing/eliminating soft tissue swelling/inflammation/restriction, improving soft tissue extensibility and allowing for proper ROM for normal function with self care, mobility, lifting and ambulation.      Modalities:     [] GAME READY (VASO)- for significant edema, swelling, pain control. Charges:  Timed Code Treatment Minutes: 43   Total Treatment Minutes: 44      [] EVAL (LOW) 52591 (typically 20 minutes face-to-face)  [] EVAL (MOD) 93409 (typically 30 minutes face-to-face)  [] EVAL (HIGH) 61917 (typically 45 minutes face-to-face)  [] RE-EVAL     [x] HA(43169) x 2    [] DRY NEEDLE 1 OR 2 MUSCLES  [] NMR (65890) x     [] DRY NEEDLE 3+ MUSCLES  [x] Manual (97661) x 1      [] TA (55865) x     [] Mech Traction (01218)  [] ES(attended) (17448)     [] ES (un) (31706):   [] VASO (57898)  [] Other:    If BWC Please Indicate Time In/Out  CPT Code Time in Time out                                   OALS:  Patient stated goal: walk without pain  [x] Progressing: [] Met: [] Not Met: [] Adjusted    Therapist goals for Patient:   Short Term Goals: To be achieved in: 2 weeks  1. Independent in HEP and progression per patient tolerance, in order to prevent re-injury. [] Progressing: [x] Met: [] Not Met: [] Adjusted  2. Patient will have a decrease in pain to facilitate improvement in movement, function, and ADLs as indicated by Functional Deficits. [x] Progressing: [] Met: [] Not Met: [] Adjusted    Long Term Goals: To be achieved in: 4 weeks  1. Disability index score of 40% or less for the LEFS to assist with reaching prior level of function. [] Progressing: [] Met: [] Not Met: [] Adjusted  2. Patient will demonstrate increased AROM to 0-10 ankle DF to allow for proper joint functioning as indicated by patients Functional Deficits. [x] Progressing: [] Met: [] Not Met: [] Adjusted  3. Patient will demonstrate an increase in Strength to good proximal hip strength and control, within 5lb HHD in LE to allow for proper functional mobility as indicated by patients Functional Deficits. [x] Progressing: [] Met: [] Not Met: [] Adjusted  4. Patient will return to standing and walking functional activities without increased symptoms or restriction.    [x] Progressing: [] Met: [] Not Met: [] Adjusted  5. Patient will report  that she is able to shag volleyballs for > 1 hr without increased symptoms or restriction. (patient specific functional goal)    [x] Progressing: [] Met: [] Not Met: [] Adjusted     ASSESSMENT: Posterior tib continues to feel improved, very little restriction in calf today. Patient doing better in regards to ankle ROM and soft tissue extensibility. Ready to progress resistance with strengthening of ankle 4 way, as well as standing ankle strengthening exercises. Upright stability with rotational movements and balance is improving as well. Will be having new orthotic devices made by orthotist for proper length at 9.5 shoe so patient doesn't need to have spacer in back of heel any longer. Return to Play: (if applicable)   []  Stage 1: Intro to Strength   []  Stage 2: Return to Run and Strength   []  Stage 3: Return to Jump and Strength   []  Stage 4: Dynamic Strength and Agility   []  Stage 5: Sport Specific Training     []  Ready to Return to Play, Meets All Above Stages   []  Not Ready for Return to Sports   Comments:            Treatment/Activity Tolerance:  [x] Patient tolerated treatment well [] Patient limited by fatique  [] Patient limited by pain  [] Patient limited by other medical complications  [] Other:     Overall Progression Towards Functional goals/ Treatment Progress Update:  [x] Patient is progressing as expected towards functional goals listed. [] Progression is slowed due to complexities/Impairments listed. [] Progression has been slowed due to co-morbidities.   [] Plan just implemented, too soon to assess goals progression <30days   [] Goals require adjustment due to lack of progress  [] Patient is not progressing as expected and requires additional follow up with physician  [] Other    Prognosis for POC: [x] Good [] Fair  [] Poor    Patient requires continued skilled intervention: [x] Yes  [] No        PLAN:   [x] Continue per plan of care [] Alter current plan (see comments)  [] Plan of care initiated [] Hold pending MD visit [] Discharge    Electronically signed by: Marga Pallas, PT    Note: If patient does not return for scheduled/recommended follow up visits, this note will serve as a discharge from care along with the most recent update on progress.

## 2022-01-24 ENCOUNTER — APPOINTMENT (OUTPATIENT)
Dept: PHYSICAL THERAPY | Age: 72
End: 2022-01-24
Payer: MEDICARE

## 2022-01-27 NOTE — PLAN OF CARE
[place] [address] [place]  Phone: [phone] Fax: [phone]     Physical Therapy Discharge Summary    Dear Referring Practitioner: Dr Marguerite Almazan,    We had the pleasure of treating the following patient for physical therapy services at [address] [place] [address]. A summary of our findings can be found in the discharge summary below. If you have any questions or concerns regarding these findings, please do not hesitate to contact me at the office phone number above.   Thank you for the referral.     Physician Signature:________________________________Date:__________________  By signing above (or electronic signature), therapists plan is approved by physician      Overall Response to Treatment:   []Patient is responding well to treatment and improvement is noted with regards  to goals   [x]Patient should continue to improve in reasonable time if they continue HEP   []Patient has plateaued and is no longer responding to skilled PT intervention    []Patient is getting worse and would benefit from return to referring MD   []Patient unable to adhere to initial POC   []Other:     Date range of Visits: 2021 thru 2022  Total Visits: 21          Physical Therapy Treatment Note/ Progress Report:     Date:  2022    Patient Name:  Lorenzo Willson    :  1950  MRN: [id number]  Restrictions/Precautions:    Medical/Treatment Diagnosis Information:  Diagnosis: R foot pain, bilateral plantar fascitis  Treatment Diagnosis: bilateral plantar fascitis M72.2, R foot pain X86.834  Insurance/Certification information:  PT Insurance Information: Medicare//Aetna  Physician Information:  Referring Practitioner: Dr Dereje Veliz of care signed (Y/N):     Date of Patient follow up with Physician:      Progress Report: [x]  Yes  []  No     Date Range for reporting period:  Beginnin2021  Ending: 1/20/2022    Progress report due (10 Rx/or 30 days whichever is less): 47/1/4948    Recertification due (POC duration/ or 90 days whichever is less): 12/8/2021     Visit # Insurance Allowable Auth Needed   21 Medicare/Aetna []Yes   [x]No     Latex Allergy:  [x]NO      []YES  Preferred Language for Healthcare:   [x]English       []other:  Functional Scale: LEFS 64/80 20% disability Date assessed:1/20/2022    Pain level: 1-2/10     SUBJECTIVE:  Patient reports that her R foot has been feeling good. Is not limited with her daily activities. Has not been back to volleyball yet to know how it does with shagging the ball, but her day to day she is doing good. Notes that she can tell how much the orthotics have helped. Patient stopped by the office and states she needs to d/c from therapy due to insurance reasons at this time.                 OBJECTIVE:     Current footwear: Ramone  ROM LEFT RIGHT     Ankle PF 60 70   Ankle DF 8 5   Ankle In 15 50   Ankle Ev 15 20   Strength  LEFT RIGHT   Ankle DF  5 4+   Ankle PF  5 4+   Ankle Inv  4+ 4+   Ankle EV  4+ 4          LEFT RIGHT   Rearfoot position neutral neutral   Forefoot position neutral varus   1st Ray position neutral neutral   1st Ray mobility flexible flexible   Calcaneal position standing     Calcaneal position squatting     Total pronation       ROM LEFT RIGHT   [facility]   DF knee straight WFL WFL               Deep Squat: dysfunctional, painful  SLS- eyes open: not tested  SLS- eyes closed: not tested  Foot strike: flat foot position  Pronation: accelerated      RESTRICTIONS/PRECAUTIONS: R THR       Charges:  Timed Code Treatment Minutes: 0   Total Treatment Minutes: 0      [] EVAL (LOW) 00360 (typically 20 minutes face-to-face)  [] EVAL (MOD) 98385 (typically 30 minutes face-to-face)  [] EVAL (HIGH) 93877 (typically 45 minutes face-to-face)  [] RE-EVAL     [] IC(19763) x     [] DRY NEEDLE 1 OR 2 MUSCLES  [] NMR (25726) x     [] DRY NEEDLE 3+ MUSCLES  [] Manual (80802) x       [] TA (84488) x     [] Mech Traction (56368)  [] ES(attended) (19494)     [] ES (un) (95493):   [] VASO (07878)  [] Other:    If BWC Please Indicate Time In/Out  CPT Code Time in Time out                                   OALS:  Patient stated goal: walk without pain  [] Progressing: [x] Met: [] Not Met: [] Adjusted    Therapist goals for Patient:   Short Term Goals: To be achieved in: 2 weeks  1. Independent in HEP and progression per patient tolerance, in order to prevent re-injury. [] Progressing: [x] Met: [] Not Met: [] Adjusted  2. Patient will have a decrease in pain to facilitate improvement in movement, function, and ADLs as indicated by Functional Deficits. [] Progressing: [x] Met: [] Not Met: [] Adjusted    Long Term Goals: To be achieved in: 4 weeks  1. Disability index score of 40% or less for the LEFS to assist with reaching prior level of function. [] Progressing: [x] Met: [] Not Met: [] Adjusted  2. Patient will demonstrate increased AROM to 0-10 ankle DF to allow for proper joint functioning as indicated by patients Functional Deficits. [] Progressing: [x] Met: [] Not Met: [] Adjusted  3. Patient will demonstrate an increase in Strength to good proximal hip strength and control, within 5lb HHD in LE to allow for proper functional mobility as indicated by patients Functional Deficits. [] Progressing: [x] Met: [] Not Met: [] Adjusted  4. Patient will return to standing and walking functional activities without increased symptoms or restriction. [] Progressing: [] Met: [] Not Met: [] Adjusted  5. Patient will report  that she is able to shag volleyballs for > 1 hr without increased symptoms or restriction. (patient specific functional goal)    [] Progressing: [] Met: [x] Not Met: [] Adjusted     ASSESSMENT: Patient d/c from PT services at this time. Doing well with all daily activities. No limitations in her daily activities.  Has not returned to assist with shagging volleyballs due to team being off over the winter break. All goals for daily activities have been met. Patient ready to d/c at this time. Return to Play: (if applicable)   []  Stage 1: Intro to Strength   []  Stage 2: Return to Run and Strength   []  Stage 3: Return to Jump and Strength   []  Stage 4: Dynamic Strength and Agility   []  Stage 5: Sport Specific Training     []  Ready to Return to Play, Meets All Above Stages   []  Not Ready for Return to Sports   Comments:            Treatment/Activity Tolerance:  [x] Patient tolerated treatment well [] Patient limited by fatique  [] Patient limited by pain  [] Patient limited by other medical complications  [] Other:     Overall Progression Towards Functional goals/ Treatment Progress Update:  [x] Patient is progressing as expected towards functional goals listed. [] Progression is slowed due to complexities/Impairments listed. [] Progression has been slowed due to co-morbidities. [] Plan just implemented, too soon to assess goals progression <30days   [] Goals require adjustment due to lack of progress  [] Patient is not progressing as expected and requires additional follow up with physician  [] Other    Prognosis for POC: [x] Good [] Fair  [] Poor    Patient requires continued skilled intervention: [x] Yes  [] No        PLAN:   [] Continue per plan of care [] Alter current plan (see comments)  [] Plan of care initiated [] Hold pending MD visit [x] Discharge    Electronically signed by: Sandrita Lauren PT    Note: If patient does not return for scheduled/recommended follow up visits, this note will serve as a discharge from care along with the most recent update on progress.

## 2024-04-16 ENCOUNTER — HOSPITAL ENCOUNTER (OUTPATIENT)
Dept: PHYSICAL THERAPY | Age: 74
Setting detail: THERAPIES SERIES
Discharge: HOME | End: 2024-04-16
Payer: MEDICARE

## 2024-04-16 DIAGNOSIS — R10.2 PELVIC PAIN: Primary | ICD-10-CM

## 2024-04-16 PROCEDURE — 97530 THERAPEUTIC ACTIVITIES: CPT | Performed by: SPECIALIST

## 2024-04-16 PROCEDURE — 97161 PT EVAL LOW COMPLEX 20 MIN: CPT | Performed by: SPECIALIST

## 2024-04-16 NOTE — PLAN OF CARE
Adjusted              CHARGE CAPTURE     PT CHARGE GRID   CPT Code (TIMED) minutes # CPT Code (UNTIMED) #     Therex (15910)     EVAL:LOW (66069 - Typically 20 minutes face-to-face) 1    Neuromusc. Re-ed (33142)    Re-Eval (41960)     Manual (80575)    Estim Unattended (23812)     Ther. Act (15878) 40 3  Mech. Traction (87889)     Gait (26535)    Dry Needle 1-2 muscle (39643)     Aquatic Therex (72151)    Dry Needle 3+ muscle (33573)     Iontophoresis (42681)    VASO (04218)     Ultrasound (75102)    Group Therapy (99595)     Estim Attended (11097)    Canalith Repositioning (61533)     Other:    Other:    Total Timed Code Tx Minutes 40 3  15     Total Treatment Minutes 55        Charge Justification:  (46659) THERAPEUTIC ACTIVITY - use of dynamic activities to improve functional performance. (Ex include squatting, ascending/descending stairs, walking, bending, lifting, catching, throwing, pushing, pulling, jumping.)  Direct, one on one contact, billed in 15-minute increments.           TREATMENT PLAN     Frequency/Duration:  1 /week for 8 weeks for the following treatment interventions:    Interventions:  Therapeutic Exercise (61918) including: strength training, ROM, and functional mobility  Therapeutic Activities (85917) including: functional mobility training and education.  Neuromuscular Re-education (93355) activation and proprioception, including postural re-education.    Manual Therapy (71076) as indicated to include: Soft Tissue Mobilization and Myofascial Release    Plan: POC initiated as per evaluation    Electronically Signed by Princess Acharya PT  DPT, CLT Date: 04/16/2024     Note: Portions of this note have been templated and/or copied from initial evaluation, reassessments and prior notes for documentation efficiency.    Note: If patient does not return for scheduled/recommended follow up visits, this note will serve as a discharge from care along with the most recent update on progress.

## 2024-05-03 ENCOUNTER — APPOINTMENT (OUTPATIENT)
Dept: PHYSICAL THERAPY | Age: 74
End: 2024-05-03
Payer: MEDICARE

## 2024-05-03 ENCOUNTER — HOSPITAL ENCOUNTER (OUTPATIENT)
Dept: PHYSICAL THERAPY | Age: 74
Setting detail: THERAPIES SERIES
Discharge: HOME | End: 2024-05-03
Payer: MEDICARE

## 2024-05-03 PROCEDURE — 97140 MANUAL THERAPY 1/> REGIONS: CPT

## 2024-05-03 PROCEDURE — 97110 THERAPEUTIC EXERCISES: CPT

## 2024-05-03 NOTE — FLOWSHEET NOTE
[facility] - Outpatient Rehabilitation and Therapy [address], [place] [address] office: [phone] fax: [phone]           Physical Therapy: TREATMENT/PROGRESS NOTE   Patient: Marilee Alonso (73 y.o. female)   Examination Date: 2024   :  1950 MRN: [id number]   Visit #:   Insurance Allowable Auth Needed   BMN []Yes    [x]No    Insurance: Payor: MEDICARE / Plan: MEDICARE PART A AND B / Product Type: *No Product type* /   Insurance ID: [id number] - (Medicare)  Secondary Insurance (if applicable): AETNA   Treatment Diagnosis:       ICD-10-CM      1. Pelvic pain  R10.2            Medical Diagnosis:  Pelvic muscle wasting [N81.84]   Referring Physician: Lea Alvarado*  PCP: Ranulfo Dahl PA     Plan of care signed (Y/N): routed     Date of Patient follow up with Physician:      Progress Report/POC: NO  POC update due: (10 visits /OR AUTH LIMITS, whichever is less)  visit 8 or 2024                                             Precautions/ Contra-indications:           Latex allergy:  YES  Pacemaker:    NO  Contraindications for Manipulation: None  Date of Surgery: 10/2023 rectocele repair  Other:    Red Flags:  None    C-SSRS Triggered by Intake questionnaire:   [x] No, Questionnaire did not trigger screening.   [] Yes, Patient intake triggered further evaluation      [] C-SSRS Screening completed  [] PCP notified via Plan of Care  [] Emergency services notified     Preferred Language for Healthcare:   [x] English       [] other:    SUBJECTIVE EXAMINATION     Patient stated complaint: Patient reports she is having spasms in her rectum . Feeling down lately because she isn't able to shag volleyballs like she used to due to her pain. Has also gained 30 pounds. BMs are pellet like to lumpy log like. Uses enemas and suppositories daily. Does consume a lot of fiber and water. Feels her issues started after rectocele repair in Oct 2023.

## 2024-05-07 ENCOUNTER — HOSPITAL ENCOUNTER (OUTPATIENT)
Dept: PHYSICAL THERAPY | Age: 74
Setting detail: THERAPIES SERIES
End: 2024-05-07
Payer: MEDICARE

## 2024-05-07 ENCOUNTER — APPOINTMENT (OUTPATIENT)
Dept: PHYSICAL THERAPY | Age: 74
End: 2024-05-07
Payer: MEDICARE

## 2024-05-14 ENCOUNTER — APPOINTMENT (OUTPATIENT)
Dept: PHYSICAL THERAPY | Age: 74
End: 2024-05-14
Payer: MEDICARE

## 2024-05-16 ENCOUNTER — HOSPITAL ENCOUNTER (OUTPATIENT)
Dept: PHYSICAL THERAPY | Age: 74
Setting detail: THERAPIES SERIES
Discharge: HOME | End: 2024-05-16
Payer: MEDICARE

## 2024-05-16 PROCEDURE — 97110 THERAPEUTIC EXERCISES: CPT

## 2024-05-16 PROCEDURE — 97140 MANUAL THERAPY 1/> REGIONS: CPT

## 2024-05-16 NOTE — FLOWSHEET NOTE
Patient will have a decrease in pain in rectum and sacrum to indicate improvement in pelvic floor strength and relaxation, muscle coordination, and/or bladder retraining.  [x] Progressing: [] Met: [] Not Met: [] Adjusted    Long Term Goals: To be achieved in: 8 weeks  1. Disability index score of 10% or less on the PFDI-20 to assist with reaching prior level of function.   [x] Progressing: increased from eval [] Met: [] Not Met: [] Adjusted  2. Patient will return to functional activities including sleeping through the night without pain waking her for improved restorative rest.  [x] Progressing: [] Met: [] Not Met: [] Adjusted  3. Patient to report eliminated rectal pain and regular bowel movements without the need to use aids (enemas/suppositories).   [x] Progressing: [] Met: [] Not Met: [] Adjusted     CHARGE CAPTURE     PT CHARGE GRID   CPT Code (TIMED) minutes # CPT Code (UNTIMED) #     Therex (40727)  15 1  EVAL:LOW (72351 - Typically 20 minutes face-to-face)     Neuromusc. Re-ed (25813)    Re-Eval (48941)     Manual (21748) 30 2  Estim Unattended (25324)     Ther. Act (88317)    Mech. Traction (35491)     Gait (92166)    Dry Needle 1-2 muscle (20560)     Aquatic Therex (86013)    Dry Needle 3+ muscle (20561)     Iontophoresis (60917)    VASO (79025)     Ultrasound (39549)    Group Therapy (84854)     Estim Attended (85231)    Canalith Repositioning (88313)     Other:    Other:    Total Timed Code Tx Minutes 45 3       Total Treatment Minutes 45        Charge Justification:  (31785) THERAPEUTIC EXERCISE - Provided verbal/tactile cueing for activities related to strengthening, flexibility, endurance, ROM performed to prevent loss of range of motion, maintain or improve muscular strength or increase flexibility, following either an injury or surgery.   (87828) MANUAL THERAPY -  Manual therapy techniques, 1 or more regions, each 15 minutes (Mobilization/manipulation, manual lymphatic drainage, manual traction)

## 2024-05-20 ENCOUNTER — HOSPITAL ENCOUNTER (OUTPATIENT)
Dept: PHYSICAL THERAPY | Age: 74
Setting detail: THERAPIES SERIES
Discharge: HOME | End: 2024-05-20
Payer: MEDICARE

## 2024-05-20 PROCEDURE — 97140 MANUAL THERAPY 1/> REGIONS: CPT

## 2024-05-20 PROCEDURE — 97110 THERAPEUTIC EXERCISES: CPT

## 2024-05-20 NOTE — FLOWSHEET NOTE
[facility] - Outpatient Rehabilitation and Therapy [address], [place] [address] office: [phone] fax: [phone]         Physical Therapy: TREATMENT/PROGRESS NOTE   Patient: Marilee Alonso (73 y.o. female)   Examination Date: 2024   :  1950 MRN: [id number]   Visit #:   Insurance Allowable Auth Needed   BMN []Yes    [x]No    Insurance: Payor: MEDICARE / Plan: MEDICARE PART A AND B / Product Type: *No Product type* /   Insurance ID: [id number] - (Medicare)  Secondary Insurance (if applicable): AETNA   Treatment Diagnosis:       ICD-10-CM      1. Pelvic pain  R10.2            Medical Diagnosis:  Pelvic muscle wasting [N81.84]   Referring Physician: Lea Alvarado*  PCP: Ranulfo Dahl PA     Plan of care signed (Y/N): Y     Date of Patient follow up with Physician:      Progress Report/POC: NO    POC update due: (10 visits /OR AUTH LIMITS, whichever is less) visit 8 or 24                                            Precautions/ Contra-indications:           Latex allergy:  YES  Pacemaker:    NO  Contraindications for Manipulation: None  Date of Surgery: 10/2023 rectocele repair - has had pain since   Other:    Red Flags:  None    C-SSRS Triggered by Intake questionnaire:   [x] No, Questionnaire did not trigger screening.   [] Yes, Patient intake triggered further evaluation      [] C-SSRS Screening completed  [] PCP notified via Plan of Care  [] Emergency services notified     Preferred Language for Healthcare:   [x] English       [] other:    SUBJECTIVE EXAMINATION     Patient stated complaint: Patient reports she is still having high amounts of pain. Had 1 hour of relief after last visit.        Test used Initial score  2024     Pain Summary VAS 0 currently, but increases and varies 8/10 in rectum  9/10   Functional questionnaire PFDI-20 38.55, 12.85% 59.30, 19.8%    Other:

## 2024-05-21 ENCOUNTER — APPOINTMENT (OUTPATIENT)
Dept: PHYSICAL THERAPY | Age: 74
End: 2024-05-21
Payer: MEDICARE

## 2024-05-28 ENCOUNTER — HOSPITAL ENCOUNTER (OUTPATIENT)
Dept: PHYSICAL THERAPY | Age: 74
Setting detail: THERAPIES SERIES
Discharge: HOME | End: 2024-05-28
Payer: MEDICARE

## 2024-05-28 ENCOUNTER — APPOINTMENT (OUTPATIENT)
Dept: PHYSICAL THERAPY | Age: 74
End: 2024-05-28
Payer: MEDICARE

## 2024-05-28 PROCEDURE — 97110 THERAPEUTIC EXERCISES: CPT

## 2024-05-28 PROCEDURE — 97140 MANUAL THERAPY 1/> REGIONS: CPT

## 2024-05-28 NOTE — FLOWSHEET NOTE
[facility] - Outpatient Rehabilitation and Therapy [address], [place] [address] office: [phone] fax: [phone]         Physical Therapy: TREATMENT/PROGRESS NOTE   Patient: Marilee Alonso (73 y.o. female)   Examination Date: 2024   :  1950 MRN: [id number]   Visit #:   Insurance Allowable Auth Needed   BMN []Yes    [x]No    Insurance: Payor: MEDICARE / Plan: MEDICARE PART A AND B / Product Type: *No Product type* /   Insurance ID: [id number] - (Medicare)  Secondary Insurance (if applicable): AETNA   Treatment Diagnosis:       ICD-10-CM      1. Pelvic pain  R10.2            Medical Diagnosis:  Pelvic muscle wasting [N81.84]   Referring Physician: Lea Alvarado*  PCP: Ranulfo Dahl PA     Plan of care signed (Y/N):     Date of Patient follow up with Physician:      Progress Report/POC: NO    POC update due: (10 visits /OR AUTH LIMITS, whichever is less) visit 8 or 24                                            Precautions/ Contra-indications:           Latex allergy:  YES  Pacemaker:    NO  Contraindications for Manipulation: None  Date of Surgery: 10/2023 rectocele repair - has had pain since   Other:    Red Flags:  None    C-SSRS Triggered by Intake questionnaire:   [x] No, Questionnaire did not trigger screening.   [] Yes, Patient intake triggered further evaluation      [] C-SSRS Screening completed  [] PCP notified via Plan of Care  [] Emergency services notified     Preferred Language for Healthcare:   [x] English       [] other:    SUBJECTIVE EXAMINATION     Patient stated complaint: Patient reports she went to the chiropractor - did some manipulations and she felt better. Has been using small dilator - but medium size is too big. Not sure she is feeling a benefit yet from dilator use. Still using enema daily.        Test used Initial score  2024     Pain Summary VAS 0 currently, but increases and varies

## 2024-06-04 ENCOUNTER — APPOINTMENT (OUTPATIENT)
Dept: PHYSICAL THERAPY | Age: 74
End: 2024-06-04
Payer: MEDICARE

## 2024-06-04 ENCOUNTER — HOSPITAL ENCOUNTER (OUTPATIENT)
Dept: PHYSICAL THERAPY | Age: 74
Setting detail: THERAPIES SERIES
Discharge: HOME | End: 2024-06-04
Payer: MEDICARE

## 2024-06-04 PROCEDURE — 97140 MANUAL THERAPY 1/> REGIONS: CPT

## 2024-06-04 PROCEDURE — 97110 THERAPEUTIC EXERCISES: CPT

## 2024-06-04 NOTE — FLOWSHEET NOTE
training and education.  Neuromuscular Re-education (11056) activation and proprioception, including postural re-education.    Manual Therapy (62064) as indicated to include: Soft Tissue Mobilization and Myofascial Release    Plan: Cont POC- Continue emphasis/focus on exercise progression, improving proper muscle recruitment and activation/motor control patterns, modulating pain, and promoting relaxation. Next visit plan to progress reps, add new exercises, continue current phase, and manual to anal canal     Electronically Signed by Liz Hough PT , DPT   Date: 06/04/2024     Note: Portions of this note have been templated and/or copied from initial evaluation, reassessments and prior notes for documentation efficiency.    Note: If patient does not return for scheduled/recommended follow up visits, this note will serve as a discharge from care along with the most recent update on progress.

## 2024-06-11 ENCOUNTER — APPOINTMENT (OUTPATIENT)
Dept: PHYSICAL THERAPY | Age: 74
End: 2024-06-11
Payer: MEDICARE

## 2024-06-11 ENCOUNTER — HOSPITAL ENCOUNTER (OUTPATIENT)
Dept: PHYSICAL THERAPY | Age: 74
Setting detail: THERAPIES SERIES
Discharge: HOME | End: 2024-06-11
Payer: MEDICARE

## 2024-06-11 PROCEDURE — 97110 THERAPEUTIC EXERCISES: CPT

## 2024-06-11 PROCEDURE — 97530 THERAPEUTIC ACTIVITIES: CPT

## 2024-06-11 PROCEDURE — 97140 MANUAL THERAPY 1/> REGIONS: CPT

## 2024-06-11 NOTE — FLOWSHEET NOTE
achieved in: 2 weeks  1. Independent in HEP and progression per patient tolerance, in order to prevent future occurrence of presenting issue.  [] Progressing: [x] Met: [] Not Met: [] Adjusted  2. Patient will have a decrease in pain in rectum and sacrum to indicate improvement in pelvic floor strength and relaxation, muscle coordination, and/or bladder retraining.  [x] Progressing: [] Met: [] Not Met: [] Adjusted    Long Term Goals: To be achieved in: 8 weeks  1. Disability index score of 10% or less on the PFDI-20 to assist with reaching prior level of function.   [x] Progressing: increased from eval [] Met: [] Not Met: [] Adjusted  2. Patient will return to functional activities including sleeping through the night without pain waking her for improved restorative rest.  [x] Progressing: [] Met: [] Not Met: [] Adjusted  3. Patient to report eliminated rectal pain and regular bowel movements without the need to use aids (enemas/suppositories).   [x] Progressing: [] Met: [] Not Met: [] Adjusted     CHARGE CAPTURE     PT CHARGE GRID   CPT Code (TIMED) minutes # CPT Code (UNTIMED) #     Therex (11660)  25 2  EVAL:LOW (36769 - Typically 20 minutes face-to-face)     Neuromusc. Re-ed (80556)    Re-Eval (62922)     Manual (11261) 31 2  Estim Unattended (43756)     Ther. Act (45239)    [facility]. Traction (87617)     Gait (35843)    Dry Needle 1-2 muscle (03739)     Aquatic Therex (38752)    Dry Needle 3+ muscle (20561)     Iontophoresis (14625)    VASO (19824)     Ultrasound (38723)    Group Therapy (39674)     Estim Attended (00478)    Canalith Repositioning (62756)     Other:    Other:    Total Timed Code Tx Minutes 56 3       Total Treatment Minutes 56        Charge Justification:  (04308) THERAPEUTIC ACTIVITY - use of dynamic activities to improve functional performance. (Ex include squatting, ascending/descending stairs, walking, bending, lifting, catching, throwing, pushing, pulling, jumping.)  Direct, one on one contact,

## 2024-06-18 ENCOUNTER — APPOINTMENT (OUTPATIENT)
Dept: PHYSICAL THERAPY | Age: 74
End: 2024-06-18
Payer: MEDICARE

## 2024-06-18 ENCOUNTER — HOSPITAL ENCOUNTER (OUTPATIENT)
Dept: PHYSICAL THERAPY | Age: 74
Setting detail: THERAPIES SERIES
End: 2024-06-18
Payer: MEDICARE

## 2024-06-25 ENCOUNTER — APPOINTMENT (OUTPATIENT)
Dept: PHYSICAL THERAPY | Age: 74
End: 2024-06-25
Payer: MEDICARE

## 2024-07-02 ENCOUNTER — APPOINTMENT (OUTPATIENT)
Dept: PHYSICAL THERAPY | Age: 74
End: 2024-07-02
Payer: MEDICARE

## 2024-07-09 ENCOUNTER — HOSPITAL ENCOUNTER (OUTPATIENT)
Dept: PHYSICAL THERAPY | Age: 74
Setting detail: THERAPIES SERIES
Discharge: HOME | End: 2024-07-09
Payer: MEDICARE

## 2024-07-09 PROCEDURE — 97140 MANUAL THERAPY 1/> REGIONS: CPT

## 2024-07-09 PROCEDURE — 97110 THERAPEUTIC EXERCISES: CPT

## 2024-07-09 NOTE — PLAN OF CARE
assessment, trigger point release in levator ani, sweeps and holds to puborectalis and iliococcygeus and walls of canal  24 min   5/3 pt in L sidelying, hypertonia throughout but good strength and ability to bear down  5/28 6/4 6/11 7/9   Internal trigger point release in levator ani, sweeps and holds to puborectalis and iliococcygeus and walls of canal, PF contract, relax and bulge with manual cues, mental imagery of opening canal with bulge to help with defecation   5/16   Internal trigger point release in levator ani, sweeps and holds to puborectalis and iliococcygeus and walls of canal, manual insertion of small dilator, attempted medium but too large   5/20   Hip mobs to R - inferior, lateral  MWM into ER and IR   6/11          NMR re-education (12231) resistance Sets/time Reps CUES NEEDED                                      Therapeutic Activity (69151)  Sets/time     See education section    6/11                                 Modalities:    No modalities applied this session    Education/Home Exercise Program:    7/9: Reassessed goals, discussed progress made and areas remaining for improvement, issued outcome measure and reviewed new score with pt as compared to eval - discussed seeing colorectal MD as next step, hasn't been seen since rectocele surgery last year      6/11: reviewed sleeping positioning to support back, legs, and belly, adjusted schedule to give additional time between sessions, reviewed HEP - holding SLR lifts due to increased pain in back, reviewed healthy bowel habits; pt going to [facility] later this week for adjustments on her back x 25 min    6/4:   Access Code: [email or web address]  Added to above HEP  - Seated Posterior Pelvic Tilt  - 1 x daily - 7 x weekly - 3 sets - 10 reps  - Seated Anterior Pelvic Tilt  - 1 x daily - 7 x weekly - 3 sets - 10 reps    5/28: reviewed HEP and changed so that pt is confident with exercises - educated pt on anatomy of low back mm, pelvic floor, and SIJ, due to

## 2024-07-23 ENCOUNTER — APPOINTMENT (OUTPATIENT)
Dept: PHYSICAL THERAPY | Age: 74
End: 2024-07-23
Payer: MEDICARE

## 2024-09-03 ENCOUNTER — HOSPITAL ENCOUNTER (OUTPATIENT)
Dept: PHYSICAL THERAPY | Age: 74
Setting detail: THERAPIES SERIES
End: 2024-09-03
Payer: MEDICARE

## 2024-09-16 ENCOUNTER — HOSPITAL ENCOUNTER (OUTPATIENT)
Dept: PHYSICAL THERAPY | Age: 74
Setting detail: THERAPIES SERIES
Discharge: HOME | End: 2024-09-16
Payer: MEDICARE

## 2024-09-16 DIAGNOSIS — K62.89 RECTAL PAIN: Primary | ICD-10-CM

## 2024-09-16 DIAGNOSIS — G89.29 CHRONIC RECTAL PAIN: ICD-10-CM

## 2024-09-16 DIAGNOSIS — K62.89 CHRONIC RECTAL PAIN: ICD-10-CM

## 2024-09-16 PROCEDURE — 97530 THERAPEUTIC ACTIVITIES: CPT | Performed by: SPECIALIST

## 2024-09-16 PROCEDURE — 97161 PT EVAL LOW COMPLEX 20 MIN: CPT | Performed by: SPECIALIST

## 2024-09-27 ENCOUNTER — APPOINTMENT (OUTPATIENT)
Dept: PHYSICAL THERAPY | Age: 74
End: 2024-09-27
Payer: MEDICARE

## 2024-10-03 ENCOUNTER — HOSPITAL ENCOUNTER (OUTPATIENT)
Dept: PHYSICAL THERAPY | Age: 74
Setting detail: THERAPIES SERIES
Discharge: HOME | End: 2024-10-03
Payer: MEDICARE

## 2024-10-03 PROCEDURE — 97530 THERAPEUTIC ACTIVITIES: CPT | Performed by: SPECIALIST

## 2024-10-03 PROCEDURE — 97140 MANUAL THERAPY 1/> REGIONS: CPT | Performed by: SPECIALIST

## 2024-10-03 PROCEDURE — 97110 THERAPEUTIC EXERCISES: CPT | Performed by: SPECIALIST

## 2024-10-03 NOTE — FLOWSHEET NOTE
NMR re-education (75913)       Relaxation techniques                                   Manual Intervention (82236) Time: 10 min   Internal tp release and stretching--rectal scooping, tp release, and rectal strumming         Therapeutic Activity (54308) Time:                  Other:     Modalities:    No modalities applied this session    Education/Home Exercise Program: Not enough time for HEP instruction this visit.  Plan to address at follow up.      ASSESSMENT       Today's Assessment:  requires a fair amount of emotional support throughout session due to the chronicity of her condition. Patient feels that she may have a UTI and has been encouraged to contact her PCP. Redirected to stay on task with PT. Patient responding well to new exercises and manual    Medical Necessity Documentation:  I certify that this patient meets the below criteria necessary for medical necessity for care and/or justification of therapy services:  The patient has associated co-morbidities along with primary diagnosis which significantly impact the rate of recovery and contribute to complexities that require skilled therapeutic intervention    Tolerance of evaluation/treatment: Good    Prognosis for POC: [x] Good [] Fair  [] Poor    Patient requires continued skilled intervention: [x] Yes  [] No      CHARGE CAPTURE     PT CHARGE GRID   CPT Code (TIMED) minutes # CPT Code (UNTIMED) #     Therex (81904)  15 1  EVAL:LOW (97668 - Typically 20 minutes face-to-face)     Neuromusc. Re-ed (68019)    Re-Eval (01801)     Manual (06908) 10 1  Estim Unattended (85013)     Ther. Act (15585) 15 1  Mech. Traction (20541)     Gait (16425)    Dry Needle 1-2 muscle (53085)     Aquatic Therex (29719)    Dry Needle 3+ muscle (20561)     Iontophoresis (11966)    VASO (88624)     Ultrasound (56946)    Group Therapy (24863)     Estim Attended (14011)    Canalith Repositioning (05706)     Physical Performance Test (86459)         Other:    Other:

## 2024-10-11 ENCOUNTER — HOSPITAL ENCOUNTER (OUTPATIENT)
Dept: PHYSICAL THERAPY | Age: 74
Setting detail: THERAPIES SERIES
Discharge: HOME | End: 2024-10-11
Payer: MEDICARE

## 2024-10-11 PROCEDURE — 97112 NEUROMUSCULAR REEDUCATION: CPT | Performed by: SPECIALIST

## 2024-10-11 PROCEDURE — 97110 THERAPEUTIC EXERCISES: CPT | Performed by: SPECIALIST

## 2024-10-11 NOTE — FLOWSHEET NOTE
as indicated to include: Soft Tissue Mobilization, Trigger Point Release, and Myofascial Release  Patient education on activity modification, progression of HEP, and pelvic floor/bowel and bladder anatomy and function    Plan: POC initiated as per evaluation    Electronically Signed by Princess Acharya PT DPT, CLT Date: 10/11/2024     Note: Portions of this note have been templated and/or copied from initial evaluation, reassessments and prior notes for documentation efficiency.    Note: If patient does not return for scheduled/recommended follow up visits, this note will serve as a discharge from care along with the most recent update on progress.    Pelvic Floor Evaluation

## 2024-10-15 ENCOUNTER — HOSPITAL ENCOUNTER (OUTPATIENT)
Dept: PHYSICAL THERAPY | Age: 74
Setting detail: THERAPIES SERIES
Discharge: HOME | End: 2024-10-15
Payer: MEDICARE

## 2024-10-15 PROCEDURE — 97110 THERAPEUTIC EXERCISES: CPT | Performed by: SPECIALIST

## 2024-10-15 PROCEDURE — 97112 NEUROMUSCULAR REEDUCATION: CPT | Performed by: SPECIALIST

## 2024-10-15 NOTE — PLAN OF CARE
below criteria necessary for medical necessity for care and/or justification of therapy services:  The patient has associated co-morbidities along with primary diagnosis which significantly impact the rate of recovery and contribute to complexities that require skilled therapeutic intervention    Tolerance of evaluation/treatment: Good    Prognosis for POC: [x] Good [] Fair  [] Poor    Patient requires continued skilled intervention: [x] Yes  [] No      CHARGE CAPTURE     PT CHARGE GRID   CPT Code (TIMED) minutes # CPT Code (UNTIMED) #     Therex (11465)  30 2  EVAL:LOW (26285 - Typically 20 minutes face-to-face)     Neuromusc. Re-ed (48054) 11 1  Re-Eval (57678)     Manual (24584)    Estim Unattended (16058)     Ther. Act (25186)    Mech. Traction (41389)     Gait (18987)    Dry Needle 1-2 muscle (20560)     Aquatic Therex (50496)    Dry Needle 3+ muscle (20561)     Iontophoresis (24798)    VASO (99630)     Ultrasound (29916)    Group Therapy (96985)     Estim Attended (78732)    Canalith Repositioning (15012)     Physical Performance Test (52329)         Other:    Other:    Total Timed Code Tx Minutes 41 3       Total Treatment Minutes 41        Charge Justification:  (25033) THERAPEUTIC EXERCISE - Provided verbal/tactile cueing for activities related to strengthening, flexibility, endurance, ROM performed to prevent loss of range of motion, maintain or improve muscular strength or increase flexibility, following either an injury or surgery.   (90916) NEUROMUSCULAR RE-EDUCATION - Therapeutic procedure, 1 or more areas, each 15 minutes; neuromuscular reeducation of movement, balance, coordination, kinesthetic sense, posture, and/or proprioception for sitting and/or standing activities      GOALS     Patient stated goal: \"get feeling better\"   [] Progressing: [] Met: [] Not Met: [] Adjusted      Therapist goals for Patient:   Short Term Goals: To be achieved in: 2 weeks  1. Independent in HEP and progression per

## 2024-10-22 ENCOUNTER — HOSPITAL ENCOUNTER (OUTPATIENT)
Dept: PHYSICAL THERAPY | Age: 74
Setting detail: THERAPIES SERIES
Discharge: HOME | End: 2024-10-22
Payer: MEDICARE

## 2024-10-22 PROCEDURE — 97110 THERAPEUTIC EXERCISES: CPT | Performed by: SPECIALIST

## 2024-10-22 PROCEDURE — 97140 MANUAL THERAPY 1/> REGIONS: CPT | Performed by: SPECIALIST

## 2024-10-22 PROCEDURE — 97530 THERAPEUTIC ACTIVITIES: CPT | Performed by: SPECIALIST

## 2024-10-22 NOTE — FLOWSHEET NOTE
return to her hobby assisting at volleyball games   [x] Progressing: [] Met: [] Not Met: [] Adjusted     Overall Progression Towards Functional goals/ Treatment Progress Update:  [x] Patient is progressing as expected towards functional goals listed.    [] Progression is slowed due to complexities/Impairments listed.  [] Progression has been slowed due to co-morbidities.  [] Plan just implemented, too soon (<30days) to assess goals progression   [] Goals require adjustment due to lack of progress  [] Patient is not progressing as expected and requires additional follow up with physician  [] Other:     TREATMENT PLAN     Frequency/Duration: 1x/week for 8 weeks for the following treatment interventions:    Interventions:  Therapeutic Exercise (55978) including: strength training, ROM, and functional mobility  Therapeutic Activities (66152) including: functional mobility training and education.  Neuromuscular Re-education (33597) activation and proprioception, including postural re-education.    Manual Therapy (37734) as indicated to include: Soft Tissue Mobilization, Trigger Point Release, and Myofascial Release  Patient education on activity modification, progression of HEP, and pelvic floor/bowel and bladder anatomy and function    Plan: POC initiated as per evaluation    Electronically Signed by Princess Acharya PT DPT, CLT Date: 10/22/2024     Note: Portions of this note have been templated and/or copied from initial evaluation, reassessments and prior notes for documentation efficiency.    Note: If patient does not return for scheduled/recommended follow up visits, this note will serve as a discharge from care along with the most recent update on progress.    Pelvic Floor Evaluation

## 2024-10-29 ENCOUNTER — APPOINTMENT (OUTPATIENT)
Dept: PHYSICAL THERAPY | Age: 74
End: 2024-10-29
Payer: MEDICARE

## 2024-10-29 ENCOUNTER — HOSPITAL ENCOUNTER (OUTPATIENT)
Dept: PHYSICAL THERAPY | Age: 74
Setting detail: THERAPIES SERIES
Discharge: HOME | End: 2024-10-29
Payer: MEDICARE

## 2024-10-29 PROCEDURE — 97140 MANUAL THERAPY 1/> REGIONS: CPT | Performed by: SPECIALIST

## 2024-10-29 PROCEDURE — 97110 THERAPEUTIC EXERCISES: CPT | Performed by: SPECIALIST

## 2024-10-29 PROCEDURE — 97530 THERAPEUTIC ACTIVITIES: CPT | Performed by: SPECIALIST

## 2024-10-29 NOTE — FLOWSHEET NOTE
[facility] - Outpatient Rehabilitation and Therapy [address], [place] [address] office: [phone] fax: [phone]      Physical Therapy: TREATMENT/PROGRESS NOTE   Patient: Marilee Alonso (74 y.o. female)   Examination Date: 10/29/2024   :  1950 MRN: [id number]   Visit #: 6   Insurance Allowable Auth Needed   [place] []Yes    [x]No    Insurance: Payor: MEDICARE / Plan: MEDICARE PART A AND B / Product Type: *No Product type* /   Insurance ID: [id number] - (Medicare)  Secondary Insurance (if applicable): AETNA   Treatment Diagnosis:     ICD-10-CM    1. Rectal pain  K62.89       2. Chronic rectal pain  K62.89     G89.29          Medical Diagnosis:  Segmental and somatic dysfunction of pelvic region [M99.05]   Referring Physician: Ej Claudio MD  PCP: Ranulfo Dahl PA   Plan of care signed (Y/N): Y    Date of Patient follow up with Physician: 10/31/24     Plan of Care Report: NO  POC update due: (10 visits /OR AUTH LIMITS, whichever is less)  11/15/2024                                             Medical History:  Comorbidities:  Anxiety  Prior Surgeries: rectocele  Relevant Medical History: has an esophagus problem for which she is going to have a procedure                                         Precautions/ Contra-indications:           Latex allergy:  NO  Pacemaker:    NO  Contraindications for Manipulation: None  Date of Surgery:  rectocele  Other:    Red Flags:  None    Suicide Screening:   The patient did not verbalize a primary behavioral concern, suicidal ideation, suicidal intent, or demonstrate suicidal behaviors.    Preferred Language for Healthcare:   [x] English       [] other:    SUBJECTIVE EXAMINATION     Patient stated complaint/comments: 10/29: reporting that she had rectal spasms that were \"horrible\" for two days over the weekend. The dilator helped to release the spasm somewhat. She ordered the lidocaine suppository and is planning to  and

## 2024-11-05 ENCOUNTER — APPOINTMENT (OUTPATIENT)
Dept: PHYSICAL THERAPY | Age: 74
End: 2024-11-05
Payer: MEDICARE

## 2024-11-12 ENCOUNTER — HOSPITAL ENCOUNTER (OUTPATIENT)
Dept: PHYSICAL THERAPY | Age: 74
Setting detail: THERAPIES SERIES
Discharge: HOME | End: 2024-11-12
Payer: MEDICARE

## 2024-11-12 PROCEDURE — 97530 THERAPEUTIC ACTIVITIES: CPT | Performed by: SPECIALIST

## 2024-11-12 PROCEDURE — 97110 THERAPEUTIC EXERCISES: CPT | Performed by: SPECIALIST

## 2024-11-12 NOTE — FLOWSHEET NOTE
[facility] [address], [place] [address] office: [phone] fax: [phone]      Physical Therapy: TREATMENT/PROGRESS NOTE   Patient: Marilee Alonso (74 y.o. female)   Examination Date: 2024   :  1950 MRN: [id number]   Visit #: 9   Insurance Allowable Auth Needed   [place] []Yes    [x]No    Insurance: Payor: MEDICARE / Plan: MEDICARE PART A AND B / Product Type: *No Product type* /   Insurance ID: [id number] - (Medicare)  Secondary Insurance (if applicable): AET[employer]   Treatment Diagnosis:     ICD-10-CM    1. Rectal pain  K62.89       2. Chronic rectal pain  K62.89     G89.29          Medical Diagnosis:  Segmental and somatic dysfunction of pelvic region [M99.05]   Referring Physician: Ej Claudio MD  PCP: Ranulfo Dahl PA   Plan of care signed (Y/N): Y    Date of Patient follow up with Physician: 10/31/24     Plan of Care Report: NO  POC update due: (10 visits /OR AUTH LIMITS, whichever is less)  11/15/2024                                             Medical History:  Comorbidities:  Anxiety  Prior Surgeries: rectocele  Relevant Medical History: has an esophagus problem for which she is going to have a procedure                                         Precautions/ Contra-indications:           Latex allergy:  NO  Pacemaker:    NO  Contraindications for Manipulation: None  Date of Surgery:  rectocele  Other:    Red Flags:  None    Suicide Screening:   The patient did not verbalize a primary behavioral concern, suicidal ideation, suicidal intent, or demonstrate suicidal behaviors.    Preferred Language for Healthcare:   [x] English       [] other:    SUBJECTIVE EXAMINATION     Patient stated complaint/comments: :continues to report poor sleep that is affecting her pain. Dr. Claudio agrees that patient can wait until after her esophageal surgery to have the botox injections. Patient is wanting standing exercises because

## 2024-11-19 ENCOUNTER — APPOINTMENT (OUTPATIENT)
Dept: PHYSICAL THERAPY | Age: 74
End: 2024-11-19
Payer: MEDICARE

## 2024-11-26 ENCOUNTER — APPOINTMENT (OUTPATIENT)
Dept: PHYSICAL THERAPY | Age: 74
End: 2024-11-26
Payer: MEDICARE